# Patient Record
Sex: MALE | Race: WHITE | NOT HISPANIC OR LATINO
[De-identification: names, ages, dates, MRNs, and addresses within clinical notes are randomized per-mention and may not be internally consistent; named-entity substitution may affect disease eponyms.]

---

## 2017-05-16 ENCOUNTER — RESULT REVIEW (OUTPATIENT)
Age: 72
End: 2017-05-16

## 2018-07-19 ENCOUNTER — EMERGENCY (EMERGENCY)
Facility: HOSPITAL | Age: 73
LOS: 1 days | Discharge: HOME | End: 2018-07-19
Attending: EMERGENCY MEDICINE | Admitting: EMERGENCY MEDICINE
Payer: MEDICARE

## 2018-07-19 VITALS
DIASTOLIC BLOOD PRESSURE: 789 MMHG | OXYGEN SATURATION: 96 % | TEMPERATURE: 98 F | SYSTOLIC BLOOD PRESSURE: 169 MMHG | HEIGHT: 68 IN | HEART RATE: 74 BPM | RESPIRATION RATE: 16 BRPM | WEIGHT: 265 LBS

## 2018-07-19 VITALS
TEMPERATURE: 98 F | HEART RATE: 67 BPM | RESPIRATION RATE: 16 BRPM | DIASTOLIC BLOOD PRESSURE: 88 MMHG | OXYGEN SATURATION: 94 % | SYSTOLIC BLOOD PRESSURE: 160 MMHG

## 2018-07-19 LAB
ALBUMIN SERPL ELPH-MCNC: 4.1 G/DL — SIGNIFICANT CHANGE UP (ref 3.3–5)
ALP SERPL-CCNC: 53 U/L — SIGNIFICANT CHANGE UP (ref 30–120)
ALT FLD-CCNC: 51 U/L DA — SIGNIFICANT CHANGE UP (ref 10–60)
ANION GAP SERPL CALC-SCNC: 8 MMOL/L — SIGNIFICANT CHANGE UP (ref 5–17)
APTT BLD: 32 SEC — SIGNIFICANT CHANGE UP (ref 27.5–37.4)
AST SERPL-CCNC: 48 U/L — HIGH (ref 10–40)
BASOPHILS # BLD AUTO: 0.07 K/UL — SIGNIFICANT CHANGE UP (ref 0–0.2)
BASOPHILS NFR BLD AUTO: 0.7 % — SIGNIFICANT CHANGE UP (ref 0–2)
BILIRUB SERPL-MCNC: 0.3 MG/DL — SIGNIFICANT CHANGE UP (ref 0.2–1.2)
BUN SERPL-MCNC: 22 MG/DL — SIGNIFICANT CHANGE UP (ref 7–23)
CALCIUM SERPL-MCNC: 8.9 MG/DL — SIGNIFICANT CHANGE UP (ref 8.4–10.5)
CHLORIDE SERPL-SCNC: 99 MMOL/L — SIGNIFICANT CHANGE UP (ref 96–108)
CK MB BLD-MCNC: 0.9 % — SIGNIFICANT CHANGE UP (ref 0–3.5)
CK MB CFR SERPL CALC: 1.9 NG/ML — SIGNIFICANT CHANGE UP (ref 0–3.6)
CK SERPL-CCNC: 222 U/L — SIGNIFICANT CHANGE UP (ref 39–308)
CO2 SERPL-SCNC: 28 MMOL/L — SIGNIFICANT CHANGE UP (ref 22–31)
CREAT SERPL-MCNC: 1.06 MG/DL — SIGNIFICANT CHANGE UP (ref 0.5–1.3)
EOSINOPHIL # BLD AUTO: 0.31 K/UL — SIGNIFICANT CHANGE UP (ref 0–0.5)
EOSINOPHIL NFR BLD AUTO: 3.1 % — SIGNIFICANT CHANGE UP (ref 0–6)
GLUCOSE SERPL-MCNC: 106 MG/DL — HIGH (ref 70–99)
HCT VFR BLD CALC: 40.6 % — SIGNIFICANT CHANGE UP (ref 39–50)
HGB BLD-MCNC: 14 G/DL — SIGNIFICANT CHANGE UP (ref 13–17)
IMM GRANULOCYTES NFR BLD AUTO: 0.6 % — SIGNIFICANT CHANGE UP (ref 0–1.5)
INR BLD: 1.16 RATIO — SIGNIFICANT CHANGE UP (ref 0.88–1.16)
LYMPHOCYTES # BLD AUTO: 1.56 K/UL — SIGNIFICANT CHANGE UP (ref 1–3.3)
LYMPHOCYTES # BLD AUTO: 15.4 % — SIGNIFICANT CHANGE UP (ref 13–44)
MCHC RBC-ENTMCNC: 30.9 PG — SIGNIFICANT CHANGE UP (ref 27–34)
MCHC RBC-ENTMCNC: 34.5 GM/DL — SIGNIFICANT CHANGE UP (ref 32–36)
MCV RBC AUTO: 89.6 FL — SIGNIFICANT CHANGE UP (ref 80–100)
MONOCYTES # BLD AUTO: 0.72 K/UL — SIGNIFICANT CHANGE UP (ref 0–0.9)
MONOCYTES NFR BLD AUTO: 7.1 % — SIGNIFICANT CHANGE UP (ref 2–14)
NEUTROPHILS # BLD AUTO: 7.38 K/UL — SIGNIFICANT CHANGE UP (ref 1.8–7.4)
NEUTROPHILS NFR BLD AUTO: 73.1 % — SIGNIFICANT CHANGE UP (ref 43–77)
PLATELET # BLD AUTO: 200 K/UL — SIGNIFICANT CHANGE UP (ref 150–400)
POTASSIUM SERPL-MCNC: 3.8 MMOL/L — SIGNIFICANT CHANGE UP (ref 3.5–5.3)
POTASSIUM SERPL-SCNC: 3.8 MMOL/L — SIGNIFICANT CHANGE UP (ref 3.5–5.3)
PROT SERPL-MCNC: 7.7 G/DL — SIGNIFICANT CHANGE UP (ref 6–8.3)
PROTHROM AB SERPL-ACNC: 12.7 SEC — SIGNIFICANT CHANGE UP (ref 9.8–12.7)
RBC # BLD: 4.53 M/UL — SIGNIFICANT CHANGE UP (ref 4.2–5.8)
RBC # FLD: 13.1 % — SIGNIFICANT CHANGE UP (ref 10.3–14.5)
SODIUM SERPL-SCNC: 135 MMOL/L — SIGNIFICANT CHANGE UP (ref 135–145)
TROPONIN I SERPL-MCNC: 0 NG/ML — LOW (ref 0.02–0.06)
WBC # BLD: 10.1 K/UL — SIGNIFICANT CHANGE UP (ref 3.8–10.5)
WBC # FLD AUTO: 10.1 K/UL — SIGNIFICANT CHANGE UP (ref 3.8–10.5)

## 2018-07-19 PROCEDURE — 82553 CREATINE MB FRACTION: CPT

## 2018-07-19 PROCEDURE — 93005 ELECTROCARDIOGRAM TRACING: CPT

## 2018-07-19 PROCEDURE — 84484 ASSAY OF TROPONIN QUANT: CPT

## 2018-07-19 PROCEDURE — 71250 CT THORAX DX C-: CPT

## 2018-07-19 PROCEDURE — 85027 COMPLETE CBC AUTOMATED: CPT

## 2018-07-19 PROCEDURE — 73110 X-RAY EXAM OF WRIST: CPT

## 2018-07-19 PROCEDURE — 80053 COMPREHEN METABOLIC PANEL: CPT

## 2018-07-19 PROCEDURE — 90471 IMMUNIZATION ADMIN: CPT

## 2018-07-19 PROCEDURE — 36415 COLL VENOUS BLD VENIPUNCTURE: CPT

## 2018-07-19 PROCEDURE — 73110 X-RAY EXAM OF WRIST: CPT | Mod: 26,RT

## 2018-07-19 PROCEDURE — 93010 ELECTROCARDIOGRAM REPORT: CPT

## 2018-07-19 PROCEDURE — 73140 X-RAY EXAM OF FINGER(S): CPT

## 2018-07-19 PROCEDURE — 90715 TDAP VACCINE 7 YRS/> IM: CPT

## 2018-07-19 PROCEDURE — 99285 EMERGENCY DEPT VISIT HI MDM: CPT

## 2018-07-19 PROCEDURE — 85610 PROTHROMBIN TIME: CPT

## 2018-07-19 PROCEDURE — 71101 X-RAY EXAM UNILAT RIBS/CHEST: CPT | Mod: 26

## 2018-07-19 PROCEDURE — 82550 ASSAY OF CK (CPK): CPT

## 2018-07-19 PROCEDURE — 99284 EMERGENCY DEPT VISIT MOD MDM: CPT | Mod: 25

## 2018-07-19 PROCEDURE — 71101 X-RAY EXAM UNILAT RIBS/CHEST: CPT

## 2018-07-19 PROCEDURE — 73140 X-RAY EXAM OF FINGER(S): CPT | Mod: 26,RT

## 2018-07-19 PROCEDURE — 85730 THROMBOPLASTIN TIME PARTIAL: CPT

## 2018-07-19 PROCEDURE — 71250 CT THORAX DX C-: CPT | Mod: 26

## 2018-07-19 RX ORDER — OXYCODONE AND ACETAMINOPHEN 5; 325 MG/1; MG/1
1 TABLET ORAL ONCE
Qty: 0 | Refills: 0 | Status: DISCONTINUED | OUTPATIENT
Start: 2018-07-19 | End: 2018-07-19

## 2018-07-19 RX ORDER — TETANUS TOXOID, REDUCED DIPHTHERIA TOXOID AND ACELLULAR PERTUSSIS VACCINE, ADSORBED 5; 2.5; 8; 8; 2.5 [IU]/.5ML; [IU]/.5ML; UG/.5ML; UG/.5ML; UG/.5ML
0.5 SUSPENSION INTRAMUSCULAR ONCE
Qty: 0 | Refills: 0 | Status: COMPLETED | OUTPATIENT
Start: 2018-07-19 | End: 2018-07-19

## 2018-07-19 RX ADMIN — OXYCODONE AND ACETAMINOPHEN 1 TABLET(S): 5; 325 TABLET ORAL at 18:15

## 2018-07-19 RX ADMIN — OXYCODONE AND ACETAMINOPHEN 1 TABLET(S): 5; 325 TABLET ORAL at 16:47

## 2018-07-19 RX ADMIN — TETANUS TOXOID, REDUCED DIPHTHERIA TOXOID AND ACELLULAR PERTUSSIS VACCINE, ADSORBED 0.5 MILLILITER(S): 5; 2.5; 8; 8; 2.5 SUSPENSION INTRAMUSCULAR at 16:48

## 2018-07-19 RX ADMIN — OXYCODONE AND ACETAMINOPHEN 1 TABLET(S): 5; 325 TABLET ORAL at 17:10

## 2018-07-19 RX ADMIN — OXYCODONE AND ACETAMINOPHEN 1 TABLET(S): 5; 325 TABLET ORAL at 17:56

## 2018-07-19 NOTE — ED PROVIDER NOTE - CARE PLAN
Principal Discharge DX:	Rib pain on right side  Secondary Diagnosis:	Wrist pain, right  Secondary Diagnosis:	Finger pain, right Principal Discharge DX:	Rib fractures  Secondary Diagnosis:	Wrist pain, right  Secondary Diagnosis:	Finger pain, right Principal Discharge DX:	Rib fractures  Assessment and plan of treatment:	3 ribs fracture 3-5 rt side  Secondary Diagnosis:	Wrist pain, right  Secondary Diagnosis:	Finger pain, right

## 2018-07-19 NOTE — ED PROVIDER NOTE - MEDICAL DECISION MAKING DETAILS
mechanical fall, right rib pain , right wrist, right 5th finger, xrays, pain med, incentive spirometer, r/o fx

## 2018-07-19 NOTE — ED PROVIDER NOTE - PSH
History of Colon Resection  sigmoid colon resection 2009 Diverticulitis  History of Colon Resection    History of Total Knee Replacement  3/1/04  History of Total Knee Replacement  Right knee 5/24/11  History of Total Shoulder Replacement  right shoulder 9/9/05  Knee Arthroscopy  bilat  knees  x3 between [id number]  S/P Coronary Artery Stent Placement  Drug eluting stent stent placement   1/2/2007 Diagonal #1  S/P Coronary Artery Stent Placement  9/6/2000 in  LAD  Shoulder Arthroscopy  1983 right shoulder

## 2018-07-19 NOTE — ED PROVIDER NOTE - ATTENDING CONTRIBUTION TO CARE
I, Dr Cisneros, have personally performed a face to face diagnostic evaluation on this patient with the PA/NP. I have reviewed the PA/NP's note and agree with the history, Physical exam and plan of care As per history 72 y male presents with s/p fall off step at doctors office,  states he injured his right ribs, right wrist, right 5th finger, has dry abrasions to right 5th finger and right knee, needs tetanus.  denies head injury, no loc, no neck pain, Pertinent PE not in any acute distress complaining of moderate to severe rt rib pain with certain movement and taking a deep breath the radiologic finding was discuss with the patient offered to stay in the hospital for observation but patient opted to go home given extensive instruction for possible complication and advice to return to ER if symptom worsen.

## 2018-07-19 NOTE — ED PROVIDER NOTE - PROGRESS NOTE DETAILS
spoke with thoracic Dr Michael Dubose, discussed case, minimally displaced rib fractures 3-5, advised can prescribe pain medication and follow up in office. patient states pain medication given helped reduce pain, patient was offered admission, declined admission, advised if any concerns, worsening condition, return to ed, rx for percocet sent to pharmacy, patient states he has lidocaine patches at home,  incentive spirometer given with instructions, copy of results given, discussed rib fractures minimally displaced 3-5.  advised patient to follow up with thoracic specialist Dr Michael Dubose, call tomorrow to arrange follow up , also follow up with his orthopedic, patient refused abrasion care, refused ace wrap to right wrist

## 2018-07-19 NOTE — ED PROVIDER NOTE - OBJECTIVE STATEMENT
72 y male presents with s/p fall off step at doctors office,  states he injured his right ribs, right wrist, right 5th finger, has dry abrasions to right 5th finger and right knee, needs tetanus.  denies head injury, no loc, no neck pain, no chest pain, no abodminal pain, no back pain, patient ambulated in to ED.  PMD Dr MIRTA Larkin, Ortho Dr Kern  states he took motrin before coming to ed, did not help much with the pain.  wife at bedside  PMH: Arthritis  BPH (Benign Prostatic Hypertrophy)  CAD (Coronary Artery Disease)    Coronary Artery  Stents  Pt had stent placement in 2000; and then 1/2/2007 (drug eluting stent), Depression/ anxiety    Diverticulitis  Dyslipidemia  GERD (Gastroesophageal Reflux Disease)  HTN (Hypertension)  MI (Myocardial Infarction)  2000  Obesity    GOLDY (Obstructive Sleep Apnea)  sleep study positive (2005), uses CPAP machine

## 2018-07-19 NOTE — ED ADULT NURSE NOTE - PMH
Arthritis    BPH (Benign Prostatic Hypertrophy)    CAD (Coronary Artery Disease)    Coronary Artery  Stents  Pt had stent placement in 2000; and then 1/2/2007 (drug eluting stent)  Depression/ anxiety    Diverticulitis    Dyslipidemia    GERD (Gastroesophageal Reflux Disease)    HTN (Hypertension)    MI (Myocardial Infarction)  2000  Obesity    GOLDY (Obstructive Sleep Apnea)  sleep study positive (2005), uses CPAP machine

## 2018-07-19 NOTE — ED PROVIDER NOTE - EXTREMITY EXAM
right lateral rib pain with palpation, no visible abnormalities , right wrist and right 5th finger pain with from, no deformity, nvi

## 2018-07-19 NOTE — ED ADULT NURSE NOTE - PSH
History of Colon Resection  sigmoid colon resection 2009 Diverticulitis  History of Colon Resection    History of Total Knee Replacement  3/1/04  History of Total Knee Replacement  Right knee 5/24/11  History of Total Shoulder Replacement  right shoulder 9/9/05  Knee Arthroscopy  bilat  knees  x3 between [id number]  S/P Coronary Artery Stent Placement  Drug eluting stent stent placement   1/2/2007 Diagonal #1  S/P Coronary Artery Stent Placement  9/6/2000 in  LAD  Shoulder Arthroscopy  1983 right shoulder

## 2018-07-19 NOTE — ED ADULT NURSE NOTE - OBJECTIVE STATEMENT
Amb to ED. Pt fell down 1 step outside a doctor office injuring his rt knee & rt 5th finger- abrasions noted on each. Able to freely move each. Also c/o tenderness over rt wrist & rt ribs. Discomfort with deep inspiration & movement

## 2018-07-19 NOTE — ED ADULT NURSE REASSESSMENT NOTE - NS ED NURSE REASSESS COMMENT FT1
pt was offered but declined ace wrap to right wrist
received report and assumed care of pt at change of shift. pt is awake and alert, ambulatory on unit. PA and MD aware of results. pt instructed on use of incentive spirometry. visitor at bedside. vs as charted

## 2019-04-22 ENCOUNTER — INPATIENT (INPATIENT)
Facility: HOSPITAL | Age: 74
LOS: 1 days | Discharge: HOME HEALTH | End: 2019-04-24
Attending: INTERNAL MEDICINE | Admitting: FAMILY MEDICINE
Payer: MEDICARE

## 2019-04-22 VITALS
WEIGHT: 190.04 LBS | OXYGEN SATURATION: 100 % | HEIGHT: 69 IN | HEART RATE: 64 BPM | RESPIRATION RATE: 18 BRPM | TEMPERATURE: 98 F

## 2019-04-22 LAB
ALBUMIN SERPL ELPH-MCNC: 3.2 G/DL — LOW (ref 3.3–5)
ALP SERPL-CCNC: 70 U/L — SIGNIFICANT CHANGE UP (ref 40–120)
ALT FLD-CCNC: 47 U/L — SIGNIFICANT CHANGE UP (ref 12–78)
ANION GAP SERPL CALC-SCNC: 8 MMOL/L — SIGNIFICANT CHANGE UP (ref 5–17)
APTT BLD: 30.5 SEC — SIGNIFICANT CHANGE UP (ref 27.5–36.3)
AST SERPL-CCNC: 32 U/L — SIGNIFICANT CHANGE UP (ref 15–37)
BASOPHILS # BLD AUTO: 0.16 K/UL — SIGNIFICANT CHANGE UP (ref 0–0.2)
BASOPHILS NFR BLD AUTO: 1.2 % — SIGNIFICANT CHANGE UP (ref 0–2)
BILIRUB SERPL-MCNC: 0.5 MG/DL — SIGNIFICANT CHANGE UP (ref 0.2–1.2)
BUN SERPL-MCNC: 13 MG/DL — SIGNIFICANT CHANGE UP (ref 7–23)
CALCIUM SERPL-MCNC: 8.5 MG/DL — SIGNIFICANT CHANGE UP (ref 8.5–10.1)
CHLORIDE SERPL-SCNC: 104 MMOL/L — SIGNIFICANT CHANGE UP (ref 96–108)
CK SERPL-CCNC: 90 U/L — SIGNIFICANT CHANGE UP (ref 26–308)
CO2 SERPL-SCNC: 29 MMOL/L — SIGNIFICANT CHANGE UP (ref 22–31)
CREAT SERPL-MCNC: 0.81 MG/DL — SIGNIFICANT CHANGE UP (ref 0.5–1.3)
EOSINOPHIL # BLD AUTO: 0.99 K/UL — HIGH (ref 0–0.5)
EOSINOPHIL NFR BLD AUTO: 7.7 % — HIGH (ref 0–6)
GLUCOSE SERPL-MCNC: 108 MG/DL — HIGH (ref 70–99)
HCT VFR BLD CALC: 33.4 % — LOW (ref 39–50)
HGB BLD-MCNC: 11.1 G/DL — LOW (ref 13–17)
IMM GRANULOCYTES NFR BLD AUTO: 1.9 % — HIGH (ref 0–1.5)
INR BLD: 1.18 RATIO — HIGH (ref 0.88–1.16)
LYMPHOCYTES # BLD AUTO: 1.4 K/UL — SIGNIFICANT CHANGE UP (ref 1–3.3)
LYMPHOCYTES # BLD AUTO: 10.8 % — LOW (ref 13–44)
MCHC RBC-ENTMCNC: 31.1 PG — SIGNIFICANT CHANGE UP (ref 27–34)
MCHC RBC-ENTMCNC: 33.2 GM/DL — SIGNIFICANT CHANGE UP (ref 32–36)
MCV RBC AUTO: 93.6 FL — SIGNIFICANT CHANGE UP (ref 80–100)
MONOCYTES # BLD AUTO: 0.76 K/UL — SIGNIFICANT CHANGE UP (ref 0–0.9)
MONOCYTES NFR BLD AUTO: 5.9 % — SIGNIFICANT CHANGE UP (ref 2–14)
NEUTROPHILS # BLD AUTO: 9.35 K/UL — HIGH (ref 1.8–7.4)
NEUTROPHILS NFR BLD AUTO: 72.5 % — SIGNIFICANT CHANGE UP (ref 43–77)
NRBC # BLD: 0 /100 WBCS — SIGNIFICANT CHANGE UP (ref 0–0)
NT-PROBNP SERPL-SCNC: 1598 PG/ML — HIGH (ref 0–125)
PLATELET # BLD AUTO: 432 K/UL — HIGH (ref 150–400)
POTASSIUM SERPL-MCNC: 3.9 MMOL/L — SIGNIFICANT CHANGE UP (ref 3.5–5.3)
POTASSIUM SERPL-SCNC: 3.9 MMOL/L — SIGNIFICANT CHANGE UP (ref 3.5–5.3)
PROT SERPL-MCNC: 7.6 GM/DL — SIGNIFICANT CHANGE UP (ref 6–8.3)
PROTHROM AB SERPL-ACNC: 13.2 SEC — HIGH (ref 10–12.9)
RBC # BLD: 3.57 M/UL — LOW (ref 4.2–5.8)
RBC # FLD: 12.9 % — SIGNIFICANT CHANGE UP (ref 10.3–14.5)
SODIUM SERPL-SCNC: 141 MMOL/L — SIGNIFICANT CHANGE UP (ref 135–145)
TROPONIN I SERPL-MCNC: 0.06 NG/ML — HIGH (ref 0.01–0.04)
TROPONIN I SERPL-MCNC: 0.06 NG/ML — HIGH (ref 0.01–0.04)
TROPONIN I SERPL-MCNC: 0.09 NG/ML — HIGH (ref 0.01–0.04)
WBC # BLD: 12.91 K/UL — HIGH (ref 3.8–10.5)
WBC # FLD AUTO: 12.91 K/UL — HIGH (ref 3.8–10.5)

## 2019-04-22 PROCEDURE — 93971 EXTREMITY STUDY: CPT | Mod: 26,RT

## 2019-04-22 PROCEDURE — 99285 EMERGENCY DEPT VISIT HI MDM: CPT

## 2019-04-22 PROCEDURE — 93010 ELECTROCARDIOGRAM REPORT: CPT

## 2019-04-22 PROCEDURE — 71275 CT ANGIOGRAPHY CHEST: CPT | Mod: 26

## 2019-04-22 RX ORDER — ALUMINUM ZIRCONIUM TRICHLOROHYDREX GLY 0.2 G/G
1 STICK TOPICAL
Qty: 0 | Refills: 0 | COMMUNITY

## 2019-04-22 RX ORDER — HEPARIN SODIUM 5000 [USP'U]/ML
5000 INJECTION INTRAVENOUS; SUBCUTANEOUS EVERY 12 HOURS
Qty: 0 | Refills: 0 | Status: DISCONTINUED | OUTPATIENT
Start: 2019-04-22 | End: 2019-04-24

## 2019-04-22 RX ORDER — PANTOPRAZOLE SODIUM 20 MG/1
40 TABLET, DELAYED RELEASE ORAL
Qty: 0 | Refills: 0 | Status: DISCONTINUED | OUTPATIENT
Start: 2019-04-22 | End: 2019-04-24

## 2019-04-22 RX ORDER — LATANOPROST 0.05 MG/ML
1 SOLUTION/ DROPS OPHTHALMIC; TOPICAL
Qty: 0 | Refills: 0 | COMMUNITY

## 2019-04-22 RX ORDER — SERTRALINE 25 MG/1
1 TABLET, FILM COATED ORAL
Qty: 0 | Refills: 0 | COMMUNITY

## 2019-04-22 RX ORDER — COLCHICINE 0.6 MG
1 TABLET ORAL
Qty: 0 | Refills: 0 | COMMUNITY

## 2019-04-22 RX ORDER — LEVOTHYROXINE SODIUM 125 MCG
0 TABLET ORAL
Qty: 90 | Refills: 0 | COMMUNITY

## 2019-04-22 RX ORDER — HYDRALAZINE HCL 50 MG
0 TABLET ORAL
Qty: 270 | Refills: 0 | COMMUNITY

## 2019-04-22 RX ORDER — LATANOPROST 0.05 MG/ML
1 SOLUTION/ DROPS OPHTHALMIC; TOPICAL AT BEDTIME
Qty: 0 | Refills: 0 | Status: DISCONTINUED | OUTPATIENT
Start: 2019-04-22 | End: 2019-04-24

## 2019-04-22 RX ORDER — DAPTOMYCIN 500 MG/10ML
600 INJECTION, POWDER, LYOPHILIZED, FOR SOLUTION INTRAVENOUS
Qty: 0 | Refills: 0 | COMMUNITY

## 2019-04-22 RX ORDER — CLOPIDOGREL BISULFATE 75 MG/1
75 TABLET, FILM COATED ORAL DAILY
Qty: 0 | Refills: 0 | Status: DISCONTINUED | OUTPATIENT
Start: 2019-04-22 | End: 2019-04-22

## 2019-04-22 RX ORDER — SERTRALINE 25 MG/1
100 TABLET, FILM COATED ORAL DAILY
Qty: 0 | Refills: 0 | Status: DISCONTINUED | OUTPATIENT
Start: 2019-04-22 | End: 2019-04-24

## 2019-04-22 RX ORDER — ASPIRIN/CALCIUM CARB/MAGNESIUM 324 MG
325 TABLET ORAL
Qty: 0 | Refills: 0 | Status: DISCONTINUED | OUTPATIENT
Start: 2019-04-22 | End: 2019-04-24

## 2019-04-22 RX ORDER — ASPIRIN/CALCIUM CARB/MAGNESIUM 324 MG
1 TABLET ORAL
Qty: 0 | Refills: 0 | COMMUNITY

## 2019-04-22 RX ORDER — VERAPAMIL HCL 240 MG
1 CAPSULE, EXTENDED RELEASE PELLETS 24 HR ORAL
Qty: 0 | Refills: 0 | COMMUNITY

## 2019-04-22 RX ORDER — LOPERAMIDE HCL 2 MG
1 TABLET ORAL
Qty: 0 | Refills: 0 | COMMUNITY

## 2019-04-22 RX ORDER — LOSARTAN POTASSIUM 100 MG/1
100 TABLET, FILM COATED ORAL DAILY
Qty: 0 | Refills: 0 | Status: DISCONTINUED | OUTPATIENT
Start: 2019-04-22 | End: 2019-04-24

## 2019-04-22 RX ORDER — LEVOTHYROXINE SODIUM 125 MCG
1 TABLET ORAL
Qty: 0 | Refills: 0 | COMMUNITY

## 2019-04-22 RX ORDER — ACETAMINOPHEN 500 MG
650 TABLET ORAL EVERY 6 HOURS
Qty: 0 | Refills: 0 | Status: DISCONTINUED | OUTPATIENT
Start: 2019-04-22 | End: 2019-04-24

## 2019-04-22 RX ORDER — HYDRALAZINE HCL 50 MG
50 TABLET ORAL DAILY
Qty: 0 | Refills: 0 | Status: DISCONTINUED | OUTPATIENT
Start: 2019-04-22 | End: 2019-04-23

## 2019-04-22 RX ORDER — LOPERAMIDE HCL 2 MG
2 TABLET ORAL
Qty: 0 | Refills: 0 | Status: DISCONTINUED | OUTPATIENT
Start: 2019-04-22 | End: 2019-04-24

## 2019-04-22 RX ORDER — SERTRALINE 25 MG/1
0 TABLET, FILM COATED ORAL
Qty: 90 | Refills: 0 | COMMUNITY

## 2019-04-22 RX ORDER — METHYLCELLULOSE 500 MG
2 TABLET ORAL
Qty: 0 | Refills: 0 | COMMUNITY

## 2019-04-22 RX ORDER — LATANOPROST 0.05 MG/ML
0 SOLUTION/ DROPS OPHTHALMIC; TOPICAL
Qty: 25 | Refills: 0 | COMMUNITY

## 2019-04-22 RX ORDER — ESOMEPRAZOLE MAGNESIUM 40 MG/1
1 CAPSULE, DELAYED RELEASE ORAL
Qty: 0 | Refills: 0 | COMMUNITY

## 2019-04-22 RX ORDER — LOSARTAN POTASSIUM 100 MG/1
1 TABLET, FILM COATED ORAL
Qty: 0 | Refills: 0 | COMMUNITY

## 2019-04-22 RX ORDER — VERAPAMIL HCL 240 MG
180 CAPSULE, EXTENDED RELEASE PELLETS 24 HR ORAL AT BEDTIME
Qty: 0 | Refills: 0 | Status: DISCONTINUED | OUTPATIENT
Start: 2019-04-22 | End: 2019-04-24

## 2019-04-22 RX ORDER — HYDRALAZINE HCL 50 MG
1 TABLET ORAL
Qty: 0 | Refills: 0 | COMMUNITY

## 2019-04-22 RX ORDER — ATORVASTATIN CALCIUM 80 MG/1
1 TABLET, FILM COATED ORAL
Qty: 0 | Refills: 0 | COMMUNITY

## 2019-04-22 RX ORDER — LEVOTHYROXINE SODIUM 125 MCG
50 TABLET ORAL DAILY
Qty: 0 | Refills: 0 | Status: DISCONTINUED | OUTPATIENT
Start: 2019-04-22 | End: 2019-04-24

## 2019-04-22 RX ORDER — IBUPROFEN 200 MG
600 TABLET ORAL ONCE
Qty: 0 | Refills: 0 | Status: COMPLETED | OUTPATIENT
Start: 2019-04-22 | End: 2019-04-22

## 2019-04-22 RX ORDER — VERAPAMIL HCL 240 MG
0 CAPSULE, EXTENDED RELEASE PELLETS 24 HR ORAL
Qty: 90 | Refills: 0 | COMMUNITY

## 2019-04-22 RX ORDER — DAPTOMYCIN 500 MG/10ML
500 INJECTION, POWDER, LYOPHILIZED, FOR SOLUTION INTRAVENOUS EVERY 24 HOURS
Qty: 0 | Refills: 0 | Status: DISCONTINUED | OUTPATIENT
Start: 2019-04-22 | End: 2019-04-24

## 2019-04-22 RX ADMIN — HEPARIN SODIUM 5000 UNIT(S): 5000 INJECTION INTRAVENOUS; SUBCUTANEOUS at 17:38

## 2019-04-22 RX ADMIN — Medication 600 MILLIGRAM(S): at 22:55

## 2019-04-22 RX ADMIN — Medication 180 MILLIGRAM(S): at 21:58

## 2019-04-22 RX ADMIN — DAPTOMYCIN 120 MILLIGRAM(S): 500 INJECTION, POWDER, LYOPHILIZED, FOR SOLUTION INTRAVENOUS at 17:36

## 2019-04-22 RX ADMIN — SERTRALINE 100 MILLIGRAM(S): 25 TABLET, FILM COATED ORAL at 15:51

## 2019-04-22 RX ADMIN — LOSARTAN POTASSIUM 100 MILLIGRAM(S): 100 TABLET, FILM COATED ORAL at 17:39

## 2019-04-22 RX ADMIN — PANTOPRAZOLE SODIUM 40 MILLIGRAM(S): 20 TABLET, DELAYED RELEASE ORAL at 15:51

## 2019-04-22 RX ADMIN — Medication 50 MICROGRAM(S): at 15:51

## 2019-04-22 RX ADMIN — Medication 50 MILLIGRAM(S): at 15:51

## 2019-04-22 RX ADMIN — Medication 325 MILLIGRAM(S): at 17:38

## 2019-04-22 RX ADMIN — Medication 600 MILLIGRAM(S): at 21:58

## 2019-04-22 RX ADMIN — Medication 2 MILLIGRAM(S): at 17:45

## 2019-04-22 NOTE — ED ADULT NURSE NOTE - NSIMPLEMENTINTERV_GEN_ALL_ED
Implemented All Fall Risk Interventions:  [place] to call system. Call bell, personal items and telephone within reach. Instruct patient to call for assistance. Room bathroom lighting operational. Non-slip footwear when patient is off stretcher. Physically safe environment: no spills, clutter or unnecessary equipment. Stretcher in lowest position, wheels locked, appropriate side rails in place. Provide visual cue, wrist band, yellow gown, etc. Monitor gait and stability. Monitor for mental status changes and reorient to person, place, and time. Review medications for side effects contributing to fall risk. Reinforce activity limits and safety measures with patient and family.

## 2019-04-22 NOTE — PATIENT PROFILE ADULT - HARM RISK FACTORS
Please bring in a copy of your advance directive at your next visit, or drop off a copy at any [facility] facility so that it can be added to your medical record.      yes

## 2019-04-22 NOTE — ED ADULT NURSE REASSESSMENT NOTE - NS ED NURSE REASSESS COMMENT FT1
Received pt from previous nurse. Pt is A&OX4. VSS on 2L. Pt ambulated to the bathroom without any complications. PICC in right arm. Pt denies any pain or discomfort at this time. Safety and comfort measures in place. Hourly rounding will be done on my time. Will continue to monitor.

## 2019-04-22 NOTE — ED PROVIDER NOTE - PMH
Arthritis    BPH (Benign Prostatic Hypertrophy)    CAD (Coronary Artery Disease)    Coronary Artery  Stents  Pt had stent placement in 2000; and then 1/2/2007 (drug eluting stent)  Depression/ anxiety    Diverticulitis    Dyslipidemia    GERD (Gastroesophageal Reflux Disease)    Gout    HTN (Hypertension)    MI (Myocardial Infarction)  2000  Obesity    GOLDY (Obstructive Sleep Apnea)  sleep study positive (2005), uses CPAP machine

## 2019-04-22 NOTE — PHARMACOTHERAPY INTERVENTION NOTE - COMMENTS
Completed medication history. Home medication reviewed with patient (who is a poor historian) and confirmed with Dr First Completed medication history. Home medication reviewed with patient (who is a poor historian) and confirmed with Dr Manning    Also spoke to Dr. Cayetano Henry who confirmed that patient takes daptomycin 600mg QD at home (as per the IV bag) but was only able to order 500mg QD here. As per his weight, the dosing should be 6 mg/kg QD for septic arthritis, which would be 500mg. He will consult with ID for the daptomycin dose

## 2019-04-22 NOTE — H&P ADULT - HISTORY OF PRESENT ILLNESS
73 year old M with PMH of CAD s/p stents (LAD 2001, 2007 drug eluting stent), GOLDY on CPAP, presents with complaint of dyspnea on exertion for 2 days. Patient was recently hospitalized at [place] for Septic arthritis of right knee. Patient was admitted for 1 week and discharged with PICC line in right arm and daily cubicin. Patient states he started having dyspnea on exertion yesterday with walking. Denies chest pain, nausea, vomiting, fever, chills, abdominal pain, knee pain, lower extremity edema. Patient states he had normal stress test and echo in 2017. Patient's cardiologist is Dr. Ross in [place]. 73 year old M with PMH of CAD s/p stents (PCI stent LAD (2000), ASMI 2000), Paroxysmal Afib, GOLDY on CPAP, presents with complaint of dyspnea on exertion for 2 days. Patient was recently hospitalized at [place] for Septic arthritis of right knee. Patient was admitted for 1 week and discharged with PICC line in right arm and daily cubicin. Patient states he started having dyspnea on exertion yesterday with walking. Denies chest pain, nausea, vomiting, fever, chills, abdominal pain, knee pain, lower extremity edema. Patient states he had normal stress test and echo recently. Patient's cardiologist is Dr. Ross in [place].    Records obtained from Dr. Ross: Echo 2018: LVEF 60-65%, mild LVH, mild aortic root dilatation, normal LV and sy stolic fxn, no Pulm HTN, trace MR, diastolic dysfxn  - Nuclear stress test 2017: Small, mild, fixed anteroapical defect. No definite ischemia.

## 2019-04-22 NOTE — H&P ADULT - NSICDXPASTSURGICALHX_GEN_ALL_CORE_FT
PAST SURGICAL HISTORY:  History of Colon Resection     History of Colon Resection sigmoid colon resection 2009 Diverticulitis    History of Total Knee Replacement Right knee 5/24/11    History of Total Knee Replacement 3/1/04    History of Total Shoulder Replacement right shoulder 9/9/05    Knee Arthroscopy bilat  knees  x3 between [id number]    S/P Coronary Artery Stent Placement 9/6/2000 in  LAD    S/P Coronary Artery Stent Placement Drug eluting stent stent placement   1/2/2007 Diagonal #1    Shoulder Arthroscopy 1983 right shoulder

## 2019-04-22 NOTE — H&P ADULT - ASSESSMENT
73 year old M with PMH of CAD s/p stents (LAD 2001, 2007 drug eluting stent), GOLDY on CPAP, presents with complaint of dyspnea on exertion for 2 days.    #) Dyspnea on exertion, Elevated Troponin: Mildly elevated troponin now downtrending. No CP, no current dyspnea. Pt recent treatment for Septic arthritis on abx treatment. Kendra score 97. AMY risk score 20 (low). No clinical evidence of HF. PE/DVT ruled out with CTA and doppler.  - Admit to Telemetry  - Cardiology Consulted: Discussed with Dr. Mills, Continue ASA, Start Plavix  - Trend Troponin (0.087 -> 0.062)  - Check CPK, BNP  - NC O2 2L > 90% goal    #) Septic Arthritis: Leukocytosis  - Continue Abx through PICC  - CBC in AM    #) GOLDY on CPAP  - CPAP qhs    #) HTN  - Continue medications    #) DVT ppx  - Heparin 73 year old M with PMH of CAD s/p stents (LAD 2001, 2007 drug eluting stent), GOLDY on CPAP, presents with complaint of dyspnea on exertion for 2 days.    #) Dyspnea on exertion, Elevated Troponin: Mildly elevated troponin now downtrending. No CP, no current dyspnea. Pt recent treatment for Septic arthritis on abx treatment. Kendra score 97. AMY risk score 20 (low). No clinical evidence of HF. PE/DVT ruled out with CTA and doppler.  - Admit to Telemetry  - Cardiology Consulted: Discussed with Dr. Mills, Continue ASA, Start Plavix; Pt declined Plavix.   - Trend Troponin (0.087 -> 0.062)  - Check CPK, BNP  - NC O2 2L > 90% goal    #) Septic Arthritis: Leukocytosis  - Continue Abx through PICC  - CBC in AM    #) GOLDY on CPAP  - CPAP qhs    #) HTN  - Continue medications    #) DVT ppx  - Heparin

## 2019-04-22 NOTE — H&P ADULT - NSHPPHYSICALEXAM_GEN_ALL_CORE
Vital Signs Last 24 Hrs  T(C): 36.9 (22 Apr 2019 11:40), Max: 36.9 (22 Apr 2019 11:40)  T(F): 98.5 (22 Apr 2019 11:40), Max: 98.5 (22 Apr 2019 11:40)  HR: 65 (22 Apr 2019 11:40) (64 - 65)  BP: 170/84 (22 Apr 2019 11:40) (170/84 - 170/84)  BP(mean): --  RR: 18 (22 Apr 2019 11:40) (18 - 18)  SpO2: 98% (22 Apr 2019 11:40) (98% - 100%)    General: WN/WD NAD  Head- Atraumatic, normocephalic   Neurology: A&Ox3, nonfocal, CN II to XII intact, power intact 5/5 in all muscle group  HEENT- PERRLA, moist mucous membrane  Neck-supple, no JVD  Respiratory: Air entry equal b/l, CTA   CVS:  S1S2, no murmurs, rubs or gallops  Abdominal: Soft, NT, ND +BS,   Genitourinary- voiding, non palpable bladder  Extremities: PICC line right upper extremity in place  Skin- multiple skin abrasions bilateral UE/LE  Psychiatric- mood stable   LN- no lymphadenopathy

## 2019-04-22 NOTE — H&P ADULT - NSICDXPASTMEDICALHX_GEN_ALL_CORE_FT
PAST MEDICAL HISTORY:  Arthritis     BPH (Benign Prostatic Hypertrophy)     CAD (Coronary Artery Disease)     Coronary Artery  Stents Pt had stent placement in 2001; and then 1/2/2007 (drug eluting stent)    Depression/ anxiety     Diverticulitis     Dyslipidemia     GERD (Gastroesophageal Reflux Disease)     Gout     HTN (Hypertension)     MI (Myocardial Infarction) 2001, 2007    Obesity     GOLDY (Obstructive Sleep Apnea) sleep study positive (2005), uses CPAP machine

## 2019-04-22 NOTE — ED ADULT TRIAGE NOTE - CHIEF COMPLAINT QUOTE
pt BIBEMS from home c/o difficulty breathing since this AM. pt had R knee drained 2 wks ago. hx htn, has not taken any daily meds. as per EMS pt was 90% on RA upon arrival. 100% on NRB upon arrival to ED.

## 2019-04-22 NOTE — ED PROVIDER NOTE - SKIN, MLM
Skin normal color for race, warm, dry and intact. No evidence of rash. Right lower extremity suture line intact

## 2019-04-22 NOTE — H&P ADULT - NSHPREVIEWOFSYSTEMS_GEN_ALL_CORE
Review of Systems:  CONSTITUTIONAL: No weakness, fevers or chills  EYES/ENT: No visual changes;  No vertigo or throat pain   NECK: No pain or stiffness  RESPIRATORY: No cough, wheezing, hemoptysis; SOB on exertion  CARDIOVASCULAR: No chest pain or palpitations  GASTROINTESTINAL: No abdominal or epigastric pain. No nausea, vomiting, or hematemesis; No diarrhea or constipation.   GENITOURINARY: No dysuria, frequency or hematuria  NEUROLOGICAL: No numbness or weakness  SKIN: No itching, burning, rashes, or lesions   All other review of systems is negative unless indicated above

## 2019-04-22 NOTE — ED PROVIDER NOTE - CONSTITUTIONAL, MLM
normal... Well appearing, well nourished, awake, alert, oriented to person, place, time/situation and in mild to moderate respiratory distress.

## 2019-04-22 NOTE — ED PROVIDER NOTE - OBJECTIVE STATEMENT
72 y/o male with a PMHx of anxiety, arthritis, BPH, CAD, depression, diverticulitis, dyslipidemia, gout, GERD, HTN, MI, GOLDY, obesity, stents, h/o right knee replacement presents to the ED c/o SOB. Pt notes he had right knee replacement 13 years ago. Pt states three weeks ago, pt had right knee pain, fever (103), and urinary incontinence. Pt went to [place] and had an I&D of the knee. Yesterday pt had SOB, decreased PO intake and felt lethargic. Denies cough. No other complaints at this time.

## 2019-04-23 LAB
BASOPHILS # BLD AUTO: 0.14 K/UL — SIGNIFICANT CHANGE UP (ref 0–0.2)
BASOPHILS NFR BLD AUTO: 1.4 % — SIGNIFICANT CHANGE UP (ref 0–2)
EOSINOPHIL # BLD AUTO: 1.05 K/UL — HIGH (ref 0–0.5)
EOSINOPHIL NFR BLD AUTO: 10.5 % — HIGH (ref 0–6)
HCT VFR BLD CALC: 31.1 % — LOW (ref 39–50)
HCV AB S/CO SERPL IA: 0.13 S/CO — SIGNIFICANT CHANGE UP (ref 0–0.99)
HCV AB SERPL-IMP: SIGNIFICANT CHANGE UP
HGB BLD-MCNC: 10.2 G/DL — LOW (ref 13–17)
IMM GRANULOCYTES NFR BLD AUTO: 1.9 % — HIGH (ref 0–1.5)
LYMPHOCYTES # BLD AUTO: 1.91 K/UL — SIGNIFICANT CHANGE UP (ref 1–3.3)
LYMPHOCYTES # BLD AUTO: 19.1 % — SIGNIFICANT CHANGE UP (ref 13–44)
MCHC RBC-ENTMCNC: 30.9 PG — SIGNIFICANT CHANGE UP (ref 27–34)
MCHC RBC-ENTMCNC: 32.8 GM/DL — SIGNIFICANT CHANGE UP (ref 32–36)
MCV RBC AUTO: 94.2 FL — SIGNIFICANT CHANGE UP (ref 80–100)
MONOCYTES # BLD AUTO: 0.74 K/UL — SIGNIFICANT CHANGE UP (ref 0–0.9)
MONOCYTES NFR BLD AUTO: 7.4 % — SIGNIFICANT CHANGE UP (ref 2–14)
NEUTROPHILS # BLD AUTO: 5.95 K/UL — SIGNIFICANT CHANGE UP (ref 1.8–7.4)
NEUTROPHILS NFR BLD AUTO: 59.7 % — SIGNIFICANT CHANGE UP (ref 43–77)
NRBC # BLD: 0 /100 WBCS — SIGNIFICANT CHANGE UP (ref 0–0)
PLATELET # BLD AUTO: 388 K/UL — SIGNIFICANT CHANGE UP (ref 150–400)
RBC # BLD: 3.3 M/UL — LOW (ref 4.2–5.8)
RBC # FLD: 13.1 % — SIGNIFICANT CHANGE UP (ref 10.3–14.5)
WBC # BLD: 9.98 K/UL — SIGNIFICANT CHANGE UP (ref 3.8–10.5)
WBC # FLD AUTO: 9.98 K/UL — SIGNIFICANT CHANGE UP (ref 3.8–10.5)

## 2019-04-23 PROCEDURE — 93306 TTE W/DOPPLER COMPLETE: CPT | Mod: 26

## 2019-04-23 PROCEDURE — 71045 X-RAY EXAM CHEST 1 VIEW: CPT | Mod: 26

## 2019-04-23 RX ORDER — LACTOBACILLUS ACIDOPHILUS 100MM CELL
1 CAPSULE ORAL
Qty: 0 | Refills: 0 | Status: DISCONTINUED | OUTPATIENT
Start: 2019-04-23 | End: 2019-04-24

## 2019-04-23 RX ORDER — HYDRALAZINE HCL 50 MG
50 TABLET ORAL THREE TIMES A DAY
Qty: 0 | Refills: 0 | Status: DISCONTINUED | OUTPATIENT
Start: 2019-04-23 | End: 2019-04-24

## 2019-04-23 RX ORDER — PSYLLIUM SEED (WITH DEXTROSE)
1 POWDER (GRAM) ORAL DAILY
Qty: 0 | Refills: 0 | Status: DISCONTINUED | OUTPATIENT
Start: 2019-04-23 | End: 2019-04-24

## 2019-04-23 RX ORDER — IBUPROFEN 200 MG
400 TABLET ORAL EVERY 6 HOURS
Qty: 0 | Refills: 0 | Status: DISCONTINUED | OUTPATIENT
Start: 2019-04-23 | End: 2019-04-24

## 2019-04-23 RX ORDER — ATORVASTATIN CALCIUM 80 MG/1
20 TABLET, FILM COATED ORAL AT BEDTIME
Qty: 0 | Refills: 0 | Status: DISCONTINUED | OUTPATIENT
Start: 2019-04-23 | End: 2019-04-24

## 2019-04-23 RX ORDER — FUROSEMIDE 40 MG
40 TABLET ORAL ONCE
Qty: 0 | Refills: 0 | Status: COMPLETED | OUTPATIENT
Start: 2019-04-23 | End: 2019-04-23

## 2019-04-23 RX ADMIN — PANTOPRAZOLE SODIUM 40 MILLIGRAM(S): 20 TABLET, DELAYED RELEASE ORAL at 05:16

## 2019-04-23 RX ADMIN — Medication 400 MILLIGRAM(S): at 13:13

## 2019-04-23 RX ADMIN — Medication 40 MILLIGRAM(S): at 10:36

## 2019-04-23 RX ADMIN — Medication 2 MILLIGRAM(S): at 05:16

## 2019-04-23 RX ADMIN — Medication 50 MILLIGRAM(S): at 05:16

## 2019-04-23 RX ADMIN — Medication 1 PACKET(S): at 14:44

## 2019-04-23 RX ADMIN — DAPTOMYCIN 120 MILLIGRAM(S): 500 INJECTION, POWDER, LYOPHILIZED, FOR SOLUTION INTRAVENOUS at 17:01

## 2019-04-23 RX ADMIN — Medication 325 MILLIGRAM(S): at 05:15

## 2019-04-23 RX ADMIN — Medication 1 TABLET(S): at 17:01

## 2019-04-23 RX ADMIN — LOSARTAN POTASSIUM 100 MILLIGRAM(S): 100 TABLET, FILM COATED ORAL at 05:15

## 2019-04-23 RX ADMIN — SERTRALINE 100 MILLIGRAM(S): 25 TABLET, FILM COATED ORAL at 10:37

## 2019-04-23 RX ADMIN — HEPARIN SODIUM 5000 UNIT(S): 5000 INJECTION INTRAVENOUS; SUBCUTANEOUS at 05:16

## 2019-04-23 RX ADMIN — Medication 400 MILLIGRAM(S): at 21:37

## 2019-04-23 RX ADMIN — Medication 325 MILLIGRAM(S): at 17:01

## 2019-04-23 RX ADMIN — Medication 50 MILLIGRAM(S): at 21:37

## 2019-04-23 RX ADMIN — LATANOPROST 1 DROP(S): 0.05 SOLUTION/ DROPS OPHTHALMIC; TOPICAL at 21:37

## 2019-04-23 RX ADMIN — Medication 2 MILLIGRAM(S): at 17:02

## 2019-04-23 RX ADMIN — Medication 50 MILLIGRAM(S): at 14:44

## 2019-04-23 RX ADMIN — Medication 180 MILLIGRAM(S): at 21:37

## 2019-04-23 RX ADMIN — Medication 50 MICROGRAM(S): at 05:16

## 2019-04-23 RX ADMIN — ATORVASTATIN CALCIUM 20 MILLIGRAM(S): 80 TABLET, FILM COATED ORAL at 21:37

## 2019-04-23 RX ADMIN — Medication 400 MILLIGRAM(S): at 22:35

## 2019-04-23 RX ADMIN — HEPARIN SODIUM 5000 UNIT(S): 5000 INJECTION INTRAVENOUS; SUBCUTANEOUS at 19:11

## 2019-04-23 NOTE — CONSULT NOTE ADULT - ASSESSMENT
1. Septic arthritis  - Patient seen and evaluated by Infectious disease.  - All labs and imaging reviewed.  - Patient currently receiving Daptomycin via PICC  - ID will follow  - Will discuss with attending Dr. Oneal. 73 year old M with PMH of CAD s/p stents, Paroxysmal Afib, GOLDY on CPAP, admitted on 4/22 for evaluation of dyspnea on exertion over preceding 2 days. Currently having cardiology evaluation for this. Of note the patient had trauma to face over 50 years ago which required major dental work as a child, most recently he had loosening of the teeth which eventually fell out, subsequently had major oral surgery for implants complicated by infection which did not respond to clindamycin as he thought he was allergic to penicillin which after skin testing proved to not be so. Subsequent treatment with augmentin was successful for his oral infection however he then developed right knee pain with a large effusion which brought him to the attention of his doctors at [facility]. An aspirate was done with a large volume of pus removed and he had a washout with removal of the plastic polymers but retention of the hardware. A picc line was placed and the patient discharged on vancomycin ( he does not know the name of the bacteria.) He developed a rash and antibiotics were changed to daptomycin which is expected to continue for a total of 6 weeks. He had to replace the picc line once after it was wet but has been having no issues with it now. He never had an echocardiogram at [facility] to rule out endocarditis. He was told that if the iv antibiotics do not work he may need further surgery and new knee replacement, this right knee prosthesis was placed in 2006.   1. Patient admitted for evaluation of dyspnea on exertion  - cardiology evaluation in progress  2. Right prosthetic knee infection  - patient to continue daptomycin as ordered and when discharged to go home on his current dose  - to follow up with his id doctors from [facility]  - may need eventual further surgery given that prosthesis is still in place  - CPK wnl  - echocardiogram to rule out vegetations  Will follow

## 2019-04-23 NOTE — PHARMACOTHERAPY INTERVENTION NOTE - INTERVENTION TYPE RECOOMEND
Dose Optimization/Non-renal Dose Adjustment
Therapy Recommended - Formulary adherence
Therapy Recommended - Additional therapy

## 2019-04-23 NOTE — PROGRESS NOTE ADULT - SUBJECTIVE AND OBJECTIVE BOX
c/c: sob    HPI: 73 year old M with PMH of CAD s/p stents (PCI stent LAD (2000), ASMI 2000), Paroxysmal Afib, GOLDY on CPAP, presents with complaint of dyspnea on exertion for 2 days. Patient was recently hospitalized at [place] for Septic arthritis of right knee. Patient was admitted for 1 week and discharged with PICC line in right arm and daily cubicin. Patient states he started having dyspnea on exertion yesterday with walking. Denies chest pain, nausea, vomiting, fever, chills, abdominal pain, knee pain, lower extremity edema. Patient states he had normal stress test and echo recently. Patient's cardiologist is Dr. Ross in [place].    Records obtained from Dr. Ross: Echo 2018: LVEF 60-65%, mild LVH, mild aortic root dilatation, normal LV and sy stolic fxn, no Pulm HTN, trace MR, diastolic dysfxn  - Nuclear stress test 2017: Small, mild, fixed anteroapical defect. No definite ischemia.    4/23: sob much improved; not on O2  Hb 10.2; baseline last yr was 14 ( pt on asa 325 mg po bid); hx of colon polyps in the past      PHYSICAL EXAM:    Daily     Daily     ICU Vital Signs Last 24 Hrs  T(C): 36.6 (23 Apr 2019 10:35), Max: 37.5 (22 Apr 2019 14:53)  T(F): 97.9 (23 Apr 2019 10:35), Max: 99.5 (22 Apr 2019 14:53)  HR: 59 (23 Apr 2019 10:35) (57 - 78)  BP: 152/59 (23 Apr 2019 10:35) (135/55 - 170/84)  BP(mean): --  ABP: --  ABP(mean): --  RR: 17 (23 Apr 2019 10:35) (17 - 18)  SpO2: 95% (23 Apr 2019 10:35) (93% - 98%)      Constitutional: Well appearing  HEENT: Atraumatic, GARRET, Normal, No congestion  Respiratory: Breath Sounds normal, no rhonchi/wheeze  Cardiovascular: N S1S2;   Gastrointestinal: Abdomen soft, non tender, Bowel Sounds present  Extremities: right lrg edema, right knee surgical anai; peripheral pulses present  Neurological: AAO x 3, no gross focal motor deficits  Skin: Non cellulitic, no rash, ulcers  Lymph Nodes: No lymphadenopathy noted  Back: No CVA tenderness   Musculoskeletal: non tender  Breasts: Deferred  Genitourinary: deferred  Rectal: Deferred                          10.2   9.98  )-----------( 388      ( 23 Apr 2019 05:40 )             31.1       CBC Full  -  ( 23 Apr 2019 05:40 )  WBC Count : 9.98 K/uL  RBC Count : 3.30 M/uL  Hemoglobin : 10.2 g/dL  Hematocrit : 31.1 %  Platelet Count - Automated : 388 K/uL  Mean Cell Volume : 94.2 fl  Mean Cell Hemoglobin : 30.9 pg  Mean Cell Hemoglobin Concentration : 32.8 gm/dL  Auto Neutrophil # : 5.95 K/uL  Auto Lymphocyte # : 1.91 K/uL  Auto Monocyte # : 0.74 K/uL  Auto Eosinophil # : 1.05 K/uL  Auto Basophil # : 0.14 K/uL  Auto Neutrophil % : 59.7 %  Auto Lymphocyte % : 19.1 %  Auto Monocyte % : 7.4 %  Auto Eosinophil % : 10.5 %  Auto Basophil % : 1.4 %      04-22    141  |  104  |  13  ----------------------------<  108<H>  3.9   |  29  |  0.81    Ca    8.5      22 Apr 2019 09:39    TPro  7.6  /  Alb  3.2<L>  /  TBili  0.5  /  DBili  x   /  AST  32  /  ALT  47  /  AlkPhos  70  04-22      LIVER FUNCTIONS - ( 22 Apr 2019 09:39 )  Alb: 3.2 g/dL / Pro: 7.6 gm/dL / ALK PHOS: 70 U/L / ALT: 47 U/L / AST: 32 U/L / GGT: x             PT/INR - ( 22 Apr 2019 09:39 )   PT: 13.2 sec;   INR: 1.18 ratio         PTT - ( 22 Apr 2019 09:39 )  PTT:30.5 sec    CARDIAC MARKERS ( 22 Apr 2019 22:24 )  0.064 ng/mL / x     / x     / x     / x      CARDIAC MARKERS ( 22 Apr 2019 16:18 )  x     / x     / 90 U/L / x     / x      CARDIAC MARKERS ( 22 Apr 2019 12:16 )  0.062 ng/mL / x     / x     / x     / x      CARDIAC MARKERS ( 22 Apr 2019 09:39 )  0.087 ng/mL / x     / x     / x     / x                    MEDICATIONS  (STANDING):  align 4 milliGRAM(s)   Oral daily  aspirin 325 milliGRAM(s) Oral two times a day  Citrucel 500 mG/Dose   Chew two times a day  DAPTOmycin IVPB 500 milliGRAM(s) IV Intermittent every 24 hours  heparin  Injectable 5000 Unit(s) SubCutaneous every 12 hours  hydrALAZINE 50 milliGRAM(s) Oral daily  latanoprost 0.005% Ophthalmic Solution 1 Drop(s) Both EYES at bedtime  levothyroxine 50 MICROGram(s) Oral daily  loperamide 2 milliGRAM(s) Oral two times a day  losartan 100 milliGRAM(s) Oral daily  pantoprazole    Tablet 40 milliGRAM(s) Oral before breakfast  sertraline 100 milliGRAM(s) Oral daily  verapamil  milliGRAM(s) Oral at bedtime

## 2019-04-23 NOTE — CONSULT NOTE ADULT - ASSESSMENT
73 year old man with the above history admitted with shortness of breath, mildly elevated flat troponins, an elevated BNP and a normal Ekg.  He also has evidence for a possible bronchitis as seen on CT scan.  The options are to continue to treat him with antibiotics and set up an inpatient nuclear stress test or to perform an inpatient cardiac catheterization.  Will discuss with the patient. 73 year old man with the above history admitted with shortness of breath(over many hours), mildly elevated flat troponins, an elevated BNP and a normal Ekg.  He also has evidence for a possible bronchitis as seen on CT scan.  I do not feel this represents ACS.  It may be related to CHF.  Will give one dose of IV furosemide.  Continue antibiotics.  Will order a TTE.

## 2019-04-23 NOTE — PROGRESS NOTE ADULT - ASSESSMENT
73 year old M with PMH of CAD s/p stents (LAD 2001, 2007 drug eluting stent), GOLDY on CPAP, presents with complaint of dyspnea on exertion for 2 days.    #) Dyspnea on exertion, Elevated Troponin: Mildly elevated troponin now downtrending. No CP, no current dyspnea. Pt recent treatment for Septic arthritis on abx treatment. Kendra score 97. AMY risk score 20 (low). No clinical evidence of HF. PE/DVT ruled out with CTA and doppler.  - Admitted to Telemetry  - Cardiology Consulted:  Continue ASA, Start Plavix; Pt declined Plavix. iv Lasix 40 mg  given on 4/23, f/u  - Trend Troponin (0.087 -> 0.062)  - awaiting echo to r/o CHF    #) Acute anemia: r/o GI cause  check FOBT  repeat cbc in am    #) Septic Arthritis: Leukocytosis  - Continue ABX through PICC  - CBC in AM    #) GOLDY on CPAP  - CPAP qhs    #) HTN  - Continue medications    #) DVT ppx  - Heparin    poc discussed at length with pt, team

## 2019-04-23 NOTE — CONSULT NOTE ADULT - SUBJECTIVE AND OBJECTIVE BOX
CHIEF COMPLAINT: Patient is a 73y old  Male who presents with a chief complaint of Dyspnea on exertion, Elevated Troponin (22 Apr 2019 13:51)      HPI: HPI:  73 year old M with PMH of CAD s/p stents (PCI stent LAD (2000), ASMI 2000), Paroxysmal Afib, GOLDY on CPAP, presents with complaint of dyspnea on exertion for 2 days. Patient was recently hospitalized at [place] for Septic arthritis of right knee. Patient was admitted for 1 week and discharged with PICC line in right arm and daily cubicin. Patient states he started having dyspnea on exertion yesterday with walking. Denies chest pain, nausea, vomiting, fever, chills, abdominal pain, knee pain, lower extremity edema. Patient states he had normal stress test and echo recently. Patient's cardiologist is Dr. Ross in [place].    Records obtained from Dr. Ross: Echo 2018: LVEF 60-65%, mild LVH, mild aortic root dilatation, normal LV and systolic fxn, no Pulm HTN, trace MR, diastolic dysfxn  - Nuclear stress test 2017: Small, mild, fixed anteroapical defect. No definite ischemia. (22 Apr 2019 13:51)      PMHx: PAST MEDICAL & SURGICAL HISTORY:  Gout  HTN (Hypertension)  BPH (Benign Prostatic Hypertrophy)  MI (Myocardial Infarction): 2001, 2007  Diverticulitis  GERD (Gastroesophageal Reflux Disease)  Depression/ anxiety  Arthritis  Dyslipidemia  Coronary Artery  Stents: Pt had stent placement in 2001; and then 1/2/2007 (drug eluting stent)  CAD (Coronary Artery Disease)  Obesity  GOLDY (Obstructive Sleep Apnea): sleep study positive (2005), uses CPAP machine  S/P Coronary Artery Stent Placement: Drug eluting stent stent placement   1/2/2007 Diagonal #1  S/P Coronary Artery Stent Placement: 9/6/2000 in  LAD  Shoulder Arthroscopy: 1983 right shoulder  Knee Arthroscopy: bilat  knees  x3 between [id number]  History of Colon Resection: sigmoid colon resection 2009 Diverticulitis  History of Colon Resection  History of Total Shoulder Replacement: right shoulder 9/9/05  History of Total Knee Replacement: 3/1/04  History of Total Knee Replacement: Right knee 5/24/11        Soc Hx:     FAMILY HISTORY:      Allergies: Allergies    Cipro I.V. (Flushing (Skin))  Keflex (Rash)  penicillin (Other)    Intolerances          REVIEW OF SYSTEMS:    As above  No chest pain + shortness of breath  No lightheadeness or syncope  No leg swelling  No palpitations  No claudication-like symptoms    Vital Signs Last 24 Hrs  T(C): 36.4 (23 Apr 2019 04:59), Max: 37.5 (22 Apr 2019 14:53)  T(F): 97.6 (23 Apr 2019 04:59), Max: 99.5 (22 Apr 2019 14:53)  HR: 57 (23 Apr 2019 04:59) (57 - 78)  BP: 135/55 (23 Apr 2019 04:59) (135/55 - 170/84)  BP(mean): --  RR: 18 (22 Apr 2019 19:49) (17 - 18)  SpO2: 93% (23 Apr 2019 04:59) (93% - 100%)    I&O's Summary      CAPILLARY BLOOD GLUCOSE          PHYSICAL EXAM:   Patient in NAD  Neck: No JVD; Carotids:  2+ without bruits  Respiratory:  Clear to A&P  Cardiovascular: S1 and S2, regular rate and rhythm, no Murmurs, gallops or rubs  Gastrointestinal:  Soft, non-tender; BS positive  Extremities: No peripheral edema  Vascular: 2+ peripheral pulses  Neurological: A/O x 3, no focal deficits      MEDICATIONS:  MEDICATIONS  (STANDING):  align 4 milliGRAM(s)   Oral daily  aspirin 325 milliGRAM(s) Oral two times a day  Citrucel 500 mG/Dose   Chew two times a day  DAPTOmycin IVPB 500 milliGRAM(s) IV Intermittent every 24 hours  heparin  Injectable 5000 Unit(s) SubCutaneous every 12 hours  hydrALAZINE 50 milliGRAM(s) Oral daily  latanoprost 0.005% Ophthalmic Solution 1 Drop(s) Both EYES at bedtime  levothyroxine 50 MICROGram(s) Oral daily  loperamide 2 milliGRAM(s) Oral two times a day  losartan 100 milliGRAM(s) Oral daily  pantoprazole    Tablet 40 milliGRAM(s) Oral before breakfast  sertraline 100 milliGRAM(s) Oral daily  verapamil  milliGRAM(s) Oral at bedtime      LABS: All Labs Reviewed:  Blood Culture:   BNP Serum Pro-Brain Natriuretic Peptide: 1598 pg/mL (04-22 @ 16:18)    CBC             WBC Count: 9.98 K/uL (04-23 @ 05:40)  WBC Count: 12.91 K/uL (04-22 @ 09:39)              Hemoglobin: 10.2 g/dL (04-23 @ 05:40)  Hemoglobin: 11.1 g/dL (04-22 @ 09:39)              Hematocrit: 31.1 % (04-23 @ 05:40)  Hematocrit: 33.4 % (04-22 @ 09:39)              Mean Cell Volume: 94.2 fl (04-23 @ 05:40)  Mean Cell Volume: 93.6 fl (04-22 @ 09:39)              Platelet Count - Automated: 388 K/uL (04-23 @ 05:40)  Platelet Count - Automated: 432 K/uL (04-22 @ 09:39)                            Cardiac markers             Troponin I, Serum: 0.064 ng/mL (04-22 @ 22:24)  Troponin I, Serum: 0.062 ng/mL (04-22 @ 12:16)  Troponin I, Serum: 0.087 ng/mL (04-22 @ 09:39)                             Chems        Sodium, Serum: 141 mmol/L (04-22 @ 09:39)          Potassium, Serum: 3.9 mmol/L (04-22 @ 09:39)          Blood Urea Nitrogen, Serum: 13 mg/dL (04-22 @ 09:39)          Creatinine 0.81                  Protein Total, Serum: 7.6 gm/dL (04-22 @ 09:39)                  Calcium, Total Serum: 8.5 mg/dL (04-22 @ 09:39)                  Bilirubin Total, Serum: 0.5 mg/dL (04-22 @ 09:39)          Alanine Aminotransferase (ALT/SGPT): 47 U/L (04-22 @ 09:39)          Aspartate Aminotransferase (AST/SGOT): 32 U/L (04-22 @ 09:39)                 INR: 1.18 ratio (04-22 @ 09:39)             RADIOLOGY: < from: CT Angio Chest PE Protocol w/ IV Cont (04.22.19 @ 11:11) >  IMPRESSION:     No pulmonary embolism.  Trace bilateral pleural effusions.  Right lower lobebronchial wall thickening raising the possibility of   bronchitis.  < from: US Duplex Venous Lower Ext Ltd, Right (04.22.19 @ 10:56) >  IMPRESSION:     No evidence of right lower extremity deep venous thrombosis.      < end of copied text >                      BECKY HOLLAND     < end of copied text >      EKG:  Normal Ekg    Telemetry: Sinus    ECHO:

## 2019-04-23 NOTE — CONSULT NOTE ADULT - SUBJECTIVE AND OBJECTIVE BOX
Patient is a 73y old  Male who presents with a chief complaint of Dyspnea on exertion, Elevated Troponin (23 Apr 2019 11:24)    HPI:  73 year old M with PMH of CAD s/p stents (PCI stent LAD (2000), ASMI 2000), Paroxysmal Afib, GOLDY on CPAP, presents with complaint of dyspnea on exertion for 2 days. Patient was recently hospitalized at [facility] for Septic arthritis of right knee. Patient was admitted for 1 week and discharged with PICC line in right arm and daily cubicin. Patient states he started having dyspnea on exertion yesterday with walking. Denies chest pain, nausea, vomiting, fever, chills, abdominal pain, knee pain, lower extremity edema. Patient states he had normal stress test and echo recently. Patient's cardiologist is Dr. Ross in [facility] [place] [facility].    Records obtained from Dr. Ross: Echo 2018: LVEF 60-65%, mild LVH, mild aortic root dilatation, normal LV and sy stolic fxn, no Pulm HTN, trace MR, diastolic dysfxn  - Nuclear stress test 2017: Small, mild, fixed anteroapical defect. No definite ischemia. (22 Apr 2019 13:51)    Patient seen today at bedside, in NAD. Patient states sutures overlying post-op site of Incision and Drainage of R knee were planned to be removed today.     PAST MEDICAL & SURGICAL HISTORY:  Gout  HTN (Hypertension)  BPH (Benign Prostatic Hypertrophy)  MI (Myocardial Infarction): 2001, 2007  Diverticulitis  GERD (Gastroesophageal Reflux Disease)  Depression/ anxiety  Arthritis  Dyslipidemia  Coronary Artery  Stents: Pt had stent placement in 2001; and then 1/2/2007 (drug eluting stent)  CAD (Coronary Artery Disease)  Obesity  GOLDY (Obstructive Sleep Apnea): sleep study positive (2005), uses CPAP machine  S/P Coronary Artery Stent Placement: Drug eluting stent stent placement   1/2/2007 Diagonal #1  S/P Coronary Artery Stent Placement: 9/6/2000 in  LAD  Shoulder Arthroscopy: 1983 right shoulder  Knee Arthroscopy: bilat  knees  x3 between [id number]  History of Colon Resection: sigmoid colon resection 2009 Diverticulitis  History of Colon Resection  History of Total Shoulder Replacement: right shoulder 9/9/05  History of Total Knee Replacement: 3/1/04  History of Total Knee Replacement: Right knee 5/24/11    MEDICATIONS  (STANDING):  aspirin 325 milliGRAM(s) Oral two times a day  atorvastatin 20 milliGRAM(s) Oral at bedtime  DAPTOmycin IVPB 500 milliGRAM(s) IV Intermittent every 24 hours  heparin  Injectable 5000 Unit(s) SubCutaneous every 12 hours  hydrALAZINE 50 milliGRAM(s) Oral three times a day  ibuprofen  Tablet. 400 milliGRAM(s) Oral every 6 hours  lactobacillus acidophilus 1 Tablet(s) Oral two times a day  latanoprost 0.005% Ophthalmic Solution 1 Drop(s) Both EYES at bedtime  levothyroxine 50 MICROGram(s) Oral daily  loperamide 2 milliGRAM(s) Oral two times a day  losartan 100 milliGRAM(s) Oral daily  pantoprazole    Tablet 40 milliGRAM(s) Oral before breakfast  psyllium Powder 1 Packet(s) Oral daily  sertraline 100 milliGRAM(s) Oral daily  verapamil  milliGRAM(s) Oral at bedtime    MEDICATIONS  (PRN):  acetaminophen   Tablet .. 650 milliGRAM(s) Oral every 6 hours PRN Temp greater or equal to 38C (100.4F), Mild Pain (1 - 3)    Allergies    Cipro I.V. 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  FAMILY HISTORY:     no history of premature cardiovascular disease in first degree relatives    ROS: the patient denies fever, no chills, no HA, no dizziness, no sore throat, no blurry vision, no CP, no palpitations, no SOB, no cough, no abdominal pain, no diarrhea, no N/V, no dysuria, no leg pain, no claudication, no rash, no joint aches, no rectal pain or bleeding, no night sweats  All other systems reviewed and are negative    Vital Signs Last 24 Hrs  T(C): 36.6 (23 Apr 2019 10:35), Max: 37.5 (22 Apr 2019 14:53)  T(F): 97.9 (23 Apr 2019 10:35), Max: 99.5 (22 Apr 2019 14:53)  HR: 59 (23 Apr 2019 10:35) (57 - 78)  BP: 152/59 (23 Apr 2019 10:35) (135/55 - 166/70)  BP(mean): --  RR: 17 (23 Apr 2019 10:35) (17 - 18)  SpO2: 95% (23 Apr 2019 10:35) (93% - 98%)  Daily     Daily     PE:    Constitutional: frail looking  HEENT: NC/AT, EOMI, PERRLA, conjunctivae clear; ears and nose atraumatic; pharynx benign  Neck: supple; thyroid not palpable  Back: no tenderness  Respiratory: respiratory effort normal; clear to auscultation  Cardiovascular: S1S2 regular, no murmurs  Abdomen: soft, not tender, not distended, positive BS; liver and spleen WNL  Genitourinary: no suprapubic tenderness  Lymphatic: no LN palpable  Musculoskeletal: no muscle tenderness, no joint swelling or tenderness  Extremities: no pedal edema  Neurological/ Psychiatric: AxOx3, Judgement and insight normal;  moving all extremities  Skin: sutures overlying surgical site s/p R knee incision and drainage.     Labs: all available labs reviewed                        10.2   9.98  )-----------( 388      ( 23 Apr 2019 05:40 )             31.1     04-22    141  |  104  |  13  ----------------------------<  108<H>  3.9   |  29  |  0.81    Ca    8.5      22 Apr 2019 09:39    TPro  7.6  /  Alb  3.2<L>  /  TBili  0.5  /  DBili  x   /  AST  32  /  ALT  47  /  AlkPhos  70  04-22     LIVER FUNCTIONS - ( 22 Apr 2019 09:39 )  Alb: 3.2 g/dL / Pro: 7.6 gm/dL / ALK PHOS: 70 U/L / ALT: 47 U/L / AST: 32 U/L / GGT: x           RAD:  < from: CT Angio Chest PE Protocol w/ IV Cont (04.22.19 @ 11:11) >  IMPRESSION:     No pulmonary embolism.  Trace bilateral pleural effusions.  Right lower lobebronchial wall thickening raising the possibility of   bronchitis.    < end of copied text >    < from: US Duplex Venous Lower Ext Ltd, Right (04.22.19 @ 10:56) >  IMPRESSION:     No evidence of right lower extremity deep venous thrombosis.    < end of copied text > Patient is a 73y old  Male who presents with a chief complaint of Dyspnea on exertion, Elevated Troponin (23 Apr 2019 11:24)    HPI:  73 year old M with PMH of CAD s/p stents, Paroxysmal Afib, GOLDY on CPAP, admitted on 4/22 for evaluation of dyspnea on exertion over preceding 2 days. Currently having cardiology evaluation for this. Of note the patient had trauma to face over 50 years ago which required major dental work as a child, most recently he had loosening of the teeth which eventually fell out, subsequently had major oral surgery for implants complicated by infection which did not respond to clindamycin as he thought he was allergic to penicillin which after skin testing proved to not be so. Subsequent treatment with augmentin was successful for his oral infection however he then developed right knee pain with a large effusion which brought him to the attention of his doctors at [facility]. An aspirate was done with a large volume of pus removed and he had a washout with removal of the plastic polymers but retention of the hardware. A picc line was placed and the patient discharged on vancomycin ( he does not know the name of the bacteria.) He developed a rash and antibiotics were changed to daptomycin which is expected to continue for a total of 6 weeks. He had to replace the picc line once after it was wet but has been having no issues with it now. He never had an echocardiogram at [facility] to rule out endocarditis. He was told that if the iv antibiotics do not work he may need further surgery and new knee replacement, this right knee prosthesis was placed in 2006.         PAST MEDICAL & SURGICAL HISTORY:  Gout  HTN (Hypertension)  BPH (Benign Prostatic Hypertrophy)  MI (Myocardial Infarction): 2001, 2007  Diverticulitis  GERD (Gastroesophageal Reflux Disease)  Depression/ anxiety  Arthritis  Dyslipidemia  Coronary Artery  Stents: Pt had stent placement in 2001; and then 1/2/2007 (drug eluting stent)  CAD (Coronary Artery Disease)  Obesity  GOLDY (Obstructive Sleep Apnea): sleep study positive (2005), uses CPAP machine  S/P Coronary Artery Stent Placement: Drug eluting stent stent placement   1/2/2007 Diagonal #1  S/P Coronary Artery Stent Placement: 9/6/2000 in  LAD  Shoulder Arthroscopy: 1983 right shoulder  Knee Arthroscopy: bilat  knees  x3 between [id number]  History of Colon Resection: sigmoid colon resection 2009 Diverticulitis  History of Colon Resection  History of Total Shoulder Replacement: right shoulder 9/9/05  History of Total Knee Replacement: 3/1/04  History of Total Knee Replacement: Right knee 5/24/11    MEDICATIONS  (STANDING):  aspirin 325 milliGRAM(s) Oral two times a day  atorvastatin 20 milliGRAM(s) Oral at bedtime  DAPTOmycin IVPB 500 milliGRAM(s) IV Intermittent every 24 hours  heparin  Injectable 5000 Unit(s) SubCutaneous every 12 hours  hydrALAZINE 50 milliGRAM(s) Oral three times a day  ibuprofen  Tablet. 400 milliGRAM(s) Oral every 6 hours  lactobacillus acidophilus 1 Tablet(s) Oral two times a day  latanoprost 0.005% Ophthalmic Solution 1 Drop(s) Both EYES at bedtime  levothyroxine 50 MICROGram(s) Oral daily  loperamide 2 milliGRAM(s) Oral two times a day  losartan 100 milliGRAM(s) Oral daily  pantoprazole    Tablet 40 milliGRAM(s) Oral before breakfast  psyllium Powder 1 Packet(s) Oral daily  sertraline 100 milliGRAM(s) Oral daily  verapamil  milliGRAM(s) Oral at bedtime    MEDICATIONS  (PRN):  acetaminophen   Tablet .. 650 milliGRAM(s) Oral every 6 hours PRN Temp greater or equal to 38C (100.4F), Mild Pain (1 - 3)    Allergies    Cipro I.V. 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; works as malpractice   FAMILY HISTORY:     no history of premature cardiovascular disease in first degree relatives    ROS: the patient denies fever, no chills, no HA, no dizziness, no sore throat, no blurry vision, no CP, no palpitations,  no cough, no abdominal pain, no diarrhea, no N/V, no dysuria, no leg pain, no claudication, no rash, no joint aches, no rectal pain or bleeding, no night sweats  All other systems reviewed and are negative    Vital Signs Last 24 Hrs  T(C): 36.6 (23 Apr 2019 10:35), Max: 37.5 (22 Apr 2019 14:53)  T(F): 97.9 (23 Apr 2019 10:35), Max: 99.5 (22 Apr 2019 14:53)  HR: 59 (23 Apr 2019 10:35) (57 - 78)  BP: 152/59 (23 Apr 2019 10:35) (135/55 - 166/70)  BP(mean): --  RR: 17 (23 Apr 2019 10:35) (17 - 18)  SpO2: 95% (23 Apr 2019 10:35) (93% - 98%)  Daily     Daily     PE:    Constitutional: nontoxic appearing  HEENT: NC/AT, EOMI, PERRLA, conjunctivae clear; ears and nose atraumatic; pharynx benign  Neck: supple; thyroid not palpable  Respiratory: respiratory effort normal; clear to auscultation  Cardiovascular: S1S2 regular, no murmurs  Abdomen: soft, not tender, not distended, positive BS; liver and spleen WNL  Genitourinary: no suprapubic tenderness  Musculoskeletal: no muscle tenderness, no joint swelling or tenderness; right knee sutures intact; full range of motion; right upper extremity with picc intact  Extremities: no pedal edema  Neurological/ Psychiatric: AxOx3, Judgement and insight normal;  moving all extremities  Skin: sutures overlying surgical site s/p R knee incision and drainage.     Labs: all available labs reviewed                        10.2   9.98  )-----------( 388      ( 23 Apr 2019 05:40 )             31.1     04-22    141  |  104  |  13  ----------------------------<  108<H>  3.9   |  29  |  0.81    Ca    8.5      22 Apr 2019 09:39    TPro  7.6  /  Alb  3.2<L>  /  TBili  0.5  /  DBili  x   /  AST  32  /  ALT  47  /  AlkPhos  70  04-22     LIVER FUNCTIONS - ( 22 Apr 2019 09:39 )  Alb: 3.2 g/dL / Pro: 7.6 gm/dL / ALK PHOS: 70 U/L / ALT: 47 U/L / AST: 32 U/L / GGT: x           RAD:  < from: CT Angio Chest PE Protocol w/ IV Cont (04.22.19 @ 11:11) >  IMPRESSION:     No pulmonary embolism.  Trace bilateral pleural effusions.  Right lower lobebronchial wall thickening raising the possibility of   bronchitis.    < end of copied text >    < from: US Duplex Venous Lower Ext Ltd, Right (04.22.19 @ 10:56) >  IMPRESSION:     No evidence of right lower extremity deep venous thrombosis.    < end of copied text >

## 2019-04-24 ENCOUNTER — TRANSCRIPTION ENCOUNTER (OUTPATIENT)
Age: 74
End: 2019-04-24

## 2019-04-24 VITALS
RESPIRATION RATE: 17 BRPM | HEART RATE: 56 BPM | DIASTOLIC BLOOD PRESSURE: 58 MMHG | SYSTOLIC BLOOD PRESSURE: 147 MMHG | TEMPERATURE: 98 F | OXYGEN SATURATION: 96 %

## 2019-04-24 LAB
HCT VFR BLD CALC: 31.9 % — LOW (ref 39–50)
HGB BLD-MCNC: 10.5 G/DL — LOW (ref 13–17)
MCHC RBC-ENTMCNC: 31.3 PG — SIGNIFICANT CHANGE UP (ref 27–34)
MCHC RBC-ENTMCNC: 32.9 GM/DL — SIGNIFICANT CHANGE UP (ref 32–36)
MCV RBC AUTO: 94.9 FL — SIGNIFICANT CHANGE UP (ref 80–100)
NRBC # BLD: 0 /100 WBCS — SIGNIFICANT CHANGE UP (ref 0–0)
PLATELET # BLD AUTO: 354 K/UL — SIGNIFICANT CHANGE UP (ref 150–400)
RBC # BLD: 3.36 M/UL — LOW (ref 4.2–5.8)
RBC # FLD: 13.2 % — SIGNIFICANT CHANGE UP (ref 10.3–14.5)
WBC # BLD: 9.6 K/UL — SIGNIFICANT CHANGE UP (ref 3.8–10.5)
WBC # FLD AUTO: 9.6 K/UL — SIGNIFICANT CHANGE UP (ref 3.8–10.5)

## 2019-04-24 RX ADMIN — SERTRALINE 100 MILLIGRAM(S): 25 TABLET, FILM COATED ORAL at 10:45

## 2019-04-24 RX ADMIN — Medication 50 MICROGRAM(S): at 05:30

## 2019-04-24 RX ADMIN — Medication 400 MILLIGRAM(S): at 05:30

## 2019-04-24 RX ADMIN — Medication 325 MILLIGRAM(S): at 05:30

## 2019-04-24 RX ADMIN — Medication 2 MILLIGRAM(S): at 05:31

## 2019-04-24 RX ADMIN — PANTOPRAZOLE SODIUM 40 MILLIGRAM(S): 20 TABLET, DELAYED RELEASE ORAL at 05:32

## 2019-04-24 RX ADMIN — Medication 50 MILLIGRAM(S): at 05:31

## 2019-04-24 RX ADMIN — Medication 400 MILLIGRAM(S): at 06:18

## 2019-04-24 RX ADMIN — LOSARTAN POTASSIUM 100 MILLIGRAM(S): 100 TABLET, FILM COATED ORAL at 05:32

## 2019-04-24 RX ADMIN — HEPARIN SODIUM 5000 UNIT(S): 5000 INJECTION INTRAVENOUS; SUBCUTANEOUS at 05:32

## 2019-04-24 RX ADMIN — Medication 400 MILLIGRAM(S): at 10:45

## 2019-04-24 RX ADMIN — Medication 1 TABLET(S): at 05:32

## 2019-04-24 NOTE — DISCHARGE NOTE NURSING/CASE MANAGEMENT/SOCIAL WORK - [email or web address]_GEN_[email or web address]_CORE
You can access the [facility] [facility] Patient Portal, offered by [facility], by registering with the following website: [email or web address]

## 2019-04-24 NOTE — PROGRESS NOTE ADULT - SUBJECTIVE AND OBJECTIVE BOX
CHIEF COMPLAINT: Patient is a 73y old  Male who presents with a chief complaint of Dyspnea on exertion, Elevated Troponin (22 Apr 2019 13:51)      HPI: HPI:  73 year old M with PMH of CAD s/p stents (PCI stent LAD (2000), ASMI 2000), Paroxysmal Afib, GOLDY on CPAP, presents with complaint of dyspnea on exertion for 2 days. Patient was recently hospitalized at [place] for Septic arthritis of right knee. Patient was admitted for 1 week and discharged with PICC line in right arm and daily cubicin. Patient states he started having dyspnea on exertion yesterday with walking. Denies chest pain, nausea, vomiting, fever, chills, abdominal pain, knee pain, lower extremity edema. Patient states he had normal stress test and echo recently. Patient's cardiologist is Dr. Ross in [place].    Records obtained from Dr. Ross: Echo 2018: LVEF 60-65%, mild LVH, mild aortic root dilatation, normal LV and systolic fxn, no Pulm HTN, trace MR, diastolic dysfxn  - Nuclear stress test 2017: Small, mild, fixed anteroapical defect. No definite ischemia. (22 Apr 2019 13:51)      PMHx: PAST MEDICAL & SURGICAL HISTORY:  Gout  HTN (Hypertension)  BPH (Benign Prostatic Hypertrophy)  MI (Myocardial Infarction): 2001, 2007  Diverticulitis  GERD (Gastroesophageal Reflux Disease)  Depression/ anxiety  Arthritis  Dyslipidemia  Coronary Artery  Stents: Pt had stent placement in 2001; and then 1/2/2007 (drug eluting stent)  CAD (Coronary Artery Disease)  Obesity  GOLDY (Obstructive Sleep Apnea): sleep study positive (2005), uses CPAP machine  S/P Coronary Artery Stent Placement: Drug eluting stent stent placement   1/2/2007 Diagonal #1  S/P Coronary Artery Stent Placement: 9/6/2000 in  LAD  Shoulder Arthroscopy: 1983 right shoulder  Knee Arthroscopy: bilat  knees  x3 between [id number]  History of Colon Resection: sigmoid colon resection 2009 Diverticulitis  History of Colon Resection  History of Total Shoulder Replacement: right shoulder 9/9/05  History of Total Knee Replacement: 3/1/04  History of Total Knee Replacement: Right knee 5/24/11        Soc Hx:     FAMILY HISTORY:      Allergies: Allergies    Cipro I.V. (Flushing (Skin))  Keflex (Rash)  penicillin (Other)    Intolerances          REVIEW OF SYSTEMS:    As above  No chest pain or shortness of breath  No lightheadeness or syncope  No leg swelling  No palpitations  No claudication-like symptoms    ICU Vital Signs Last 24 Hrs  T(C): 36.4 (24 Apr 2019 04:28), Max: 36.9 (23 Apr 2019 16:40)  T(F): 97.5 (24 Apr 2019 04:28), Max: 98.4 (23 Apr 2019 16:40)  HR: 56 (24 Apr 2019 04:28) (56 - 66)  BP: 147/58 (24 Apr 2019 04:28) (142/69 - 170/73)  BP(mean): --  ABP: --  ABP(mean): --  RR: 17 (24 Apr 2019 04:28) (16 - 17)  SpO2: 96% (24 Apr 2019 04:28) (93% - 96%)      I&O's Summary      CAPILLARY BLOOD GLUCOSE          PHYSICAL EXAM:   Patient in NAD  Neck: No JVD; Carotids:  2+ without bruits  Respiratory:  Clear to A&P  Cardiovascular: S1 and S2, regular rate and rhythm, no Murmurs, gallops or rubs  Gastrointestinal:  Soft, non-tender; BS positive  Extremities: No peripheral edema  Vascular: 2+ peripheral pulses  Neurological: A/O x 3, no focal deficits      MEDICATIONS  (STANDING):  aspirin 325 milliGRAM(s) Oral two times a day  atorvastatin 20 milliGRAM(s) Oral at bedtime  DAPTOmycin IVPB 500 milliGRAM(s) IV Intermittent every 24 hours  heparin  Injectable 5000 Unit(s) SubCutaneous every 12 hours  hydrALAZINE 50 milliGRAM(s) Oral three times a day  ibuprofen  Tablet. 400 milliGRAM(s) Oral every 6 hours  lactobacillus acidophilus 1 Tablet(s) Oral two times a day  latanoprost 0.005% Ophthalmic Solution 1 Drop(s) Both EYES at bedtime  levothyroxine 50 MICROGram(s) Oral daily  loperamide 2 milliGRAM(s) Oral two times a day  losartan 100 milliGRAM(s) Oral daily  pantoprazole    Tablet 40 milliGRAM(s) Oral before breakfast  psyllium Powder 1 Packet(s) Oral daily  sertraline 100 milliGRAM(s) Oral daily  verapamil  milliGRAM(s) Oral at bedtime        LABS: All Labs Reviewed:  Blood Culture:   BNP Serum Pro-Brain Natriuretic Peptide: 1598 pg/mL (04-22 @ 16:18)    CBC             WBC Count: 9.98 K/uL (04-23 @ 05:40)  WBC Count: 12.91 K/uL (04-22 @ 09:39)              Hemoglobin: 10.2 g/dL (04-23 @ 05:40)  Hemoglobin: 11.1 g/dL (04-22 @ 09:39)              Hematocrit: 31.1 % (04-23 @ 05:40)  Hematocrit: 33.4 % (04-22 @ 09:39)              Mean Cell Volume: 94.2 fl (04-23 @ 05:40)  Mean Cell Volume: 93.6 fl (04-22 @ 09:39)              Platelet Count - Automated: 388 K/uL (04-23 @ 05:40)  Platelet Count - Automated: 432 K/uL (04-22 @ 09:39)                        10.5   9.60  )-----------( 354      ( 24 Apr 2019 05:26 )             31.9                               Cardiac markers             Troponin I, Serum: 0.064 ng/mL (04-22 @ 22:24)  Troponin I, Serum: 0.062 ng/mL (04-22 @ 12:16)  Troponin I, Serum: 0.087 ng/mL (04-22 @ 09:39)                             Chems        Sodium, Serum: 141 mmol/L (04-22 @ 09:39)          Potassium, Serum: 3.9 mmol/L (04-22 @ 09:39)          Blood Urea Nitrogen, Serum: 13 mg/dL (04-22 @ 09:39)          Creatinine 0.81                  Protein Total, Serum: 7.6 gm/dL (04-22 @ 09:39)                  Calcium, Total Serum: 8.5 mg/dL (04-22 @ 09:39)                  Bilirubin Total, Serum: 0.5 mg/dL (04-22 @ 09:39)          Alanine Aminotransferase (ALT/SGPT): 47 U/L (04-22 @ 09:39)          Aspartate Aminotransferase (AST/SGOT): 32 U/L (04-22 @ 09:39)                                INR: 1.18 ratio (04-22 @ 09:39)             RADIOLOGY: < from: CT Angio Chest PE Protocol w/ IV Cont (04.22.19 @ 11:11) >  IMPRESSION:     No pulmonary embolism.  Trace bilateral pleural effusions.  Right lower lobebronchial wall thickening raising the possibility of   bronchitis.  < from: US Duplex Venous Lower Ext Ltd, Right (04.22.19 @ 10:56) >  IMPRESSION:     No evidence of right lower extremity deep venous thrombosis.      < end of copied text >                      BECKY HOLLAND     < end of copied text >      EKG:  Normal Ekg    Telemetry: Sinus    ECHO:  < from: Transthoracic Echocardiogram (04.23.19 @ 09:14) >(my reading)   Summary     Moderate eccentric(posterior)mitral regurgitation is present.   Mild mitral annular calcification is present.   Mild aortic sclerosis is present with normal valvular opening.   Moderate (2+) tricuspid valve regurgitation is present.   Moderate to severe pulmonary hypertension.   Normal appearing pulmonic valve structure and function.   The left atrium is moderately dilated.   The left ventricle is normal in size, wall motion and contractility.   Mild concentric left ventricular hypertrophy is present.   Estimated left ventricular ejection fraction is 60-65 %.   Normal appearing right atrium.   Normal appearing right ventricle structure and function.   No evidence of pericardial effusion.     Signature     ----------------------------------------------------------------   Electronically signed by Donna Arellano MD(Interpreting   physician) on 04/23/2019 05:18 PM    < end of copied text >

## 2019-04-24 NOTE — DISCHARGE NOTE PROVIDER - CARE PROVIDER_API CALL
Latasha Hilton)  Internal Medicine  [address] [place] [address]  Phone: [phone]  Fax: [phone]  Follow Up Time:

## 2019-04-24 NOTE — DISCHARGE NOTE PROVIDER - HOSPITAL COURSE
c/c: sob        HPI: 73 year old M with PMH of CAD s/p stents (PCI stent LAD (2000), ASMI 2000), Paroxysmal Afib, GOLDY on CPAP, presents with complaint of dyspnea on exertion for 2 days. Patient was recently hospitalized at [place] for Septic arthritis of right knee. Patient was admitted for 1 week and discharged with PICC line in right arm and daily cubicin. Patient states he started having dyspnea on exertion yesterday with walking. Denies chest pain, nausea, vomiting, fever, chills, abdominal pain, knee pain, lower extremity edema. Patient states he had normal stress test and echo recently. Patient's cardiologist is Dr. Ross in [place].        Records obtained from Dr. Ross: Echo 2018: LVEF 60-65%, mild LVH, mild aortic root dilatation, normal LV and sy stolic fxn, no Pulm HTN, trace MR, diastolic dysfxn    - Nuclear stress test 2017: Small, mild, fixed anteroapical defect. No definite ischemia.        4/23: sob much improved; not on O2    Hb 10.2; baseline last yr was 14 ( pt on asa 325 mg po bid); hx of colon polyps in the past            PHYSICAL EXAM:        Daily       Daily         ICU Vital Signs Last 24 Hrs    T(C): 36.4 (24 Apr 2019 04:28), Max: 36.9 (23 Apr 2019 16:40)    T(F): 97.5 (24 Apr 2019 04:28), Max: 98.4 (23 Apr 2019 16:40)    HR: 56 (24 Apr 2019 04:28) (56 - 66)    BP: 147/58 (24 Apr 2019 04:28) (142/69 - 170/73)    BP(mean): --    ABP: --    ABP(mean): --    RR: 17 (24 Apr 2019 04:28) (16 - 17)    SpO2: 96% (24 Apr 2019 04:28) (93% - 96%)            Constitutional: Well appearing    HEENT: Atraumatic, GARRET, Normal, No congestion    Respiratory: Breath Sounds normal, no rhonchi/wheeze    Cardiovascular: N S1S2; ALDO present    Gastrointestinal: Abdomen soft, non tender, Bowel Sounds present    Extremities: right leg edema, peripheral pulses present, right knee sutures    Neurological: AAO x 3, no gross focal motor deficits    Skin: Non cellulitic, no rash, ulcers    Lymph Nodes: No lymphadenopathy noted    Back: No CVA tenderness     Musculoskeletal: non tender    Breasts: Deferred    Genitourinary: deferred    Rectal: Deferred            73 year old M with PMH of CAD s/p stents (LAD 2001, 2007 drug eluting stent), GOLDY on CPAP, presents with complaint of dyspnea on exertion for 2 days.        #) Dyspnea on exertion, Elevated Troponin: Mildly elevated troponin now downtrending. No CP, no current dyspnea. Pt recent treatment for Septic arthritis on abx treatment. Kendra score 97. AMY risk score 20 (low). No clinical evidence of HF. - - -PE/DVT ruled out with CTA and doppler.    - Admitted to Telemetry    - Cardiology Consulted:  Continue ASA, Start Plavix; Pt declined Plavix. iv Lasix 40 mg  given on 4/23, f/u, better, so more sob    - Trend Troponin (0.087 -> 0.062)    - Echo noted; mod to severe MR, mod TR, severe pulmonary HTN        #) mod to severe MR, mod TR, severe pulmonary HTN: see you pcp and cardio along with a pulmonologist for further management.    If needed, JOHN could be done to r/o endocarditis if ABX failure suspected.         #) Acute anemia: r/o GI cause    check FOBT with PCP    repeat cbc in 2-3 days with pcp        #) Septic Arthritis: Leukocytosis    - Continue ABX through PICC        #) GOLDY on CPAP    - CPAP qhs        #) HTN    - Continue medications        DISPO: f/u with your PCP Dr. Elissa Hilton in 1-3 days. Check Stool for occult blood. check CBC in 2-3 days. f/u with your cardiologist in 1-3 days for above echo abnormalities. See a pulmonologist for severe pulmonary HTN.  GI work up as out pt. Reduce weight. Right knee suture removal.         d/c home today; pt eager to go home.         poc discussed with pt, team, Dr. Oneal. Called pt's PCP Dr. Elissa Hilton's office in [facility]. She is on vacation. I was told by  that Covering Physician would call me later on.         total time spent 47 min. c/c: sob        HPI: 73 year old M with PMH of CAD s/p stents (PCI stent LAD (2000), ASMI 2000), Paroxysmal Afib, GOLDY on CPAP, presents with complaint of dyspnea on exertion for 2 days. Patient was recently hospitalized at [place] for Septic arthritis of right knee. Patient was admitted for 1 week and discharged with PICC line in right arm and daily cubicin. Patient states he started having dyspnea on exertion yesterday with walking. Denies chest pain, nausea, vomiting, fever, chills, abdominal pain, knee pain, lower extremity edema. Patient states he had normal stress test and echo recently. Patient's cardiologist is Dr. Ross in [place].        Records obtained from Dr. Ross: Echo 2018: LVEF 60-65%, mild LVH, mild aortic root dilatation, normal LV and sy stolic fxn, no Pulm HTN, trace MR, diastolic dysfxn    - Nuclear stress test 2017: Small, mild, fixed anteroapical defect. No definite ischemia.        4/23: sob much improved; not on O2    Hb 10.2; baseline last yr was 14 ( pt on asa 325 mg po bid); hx of colon polyps in the past            PHYSICAL EXAM:        Daily       Daily         ICU Vital Signs Last 24 Hrs    T(C): 36.4 (24 Apr 2019 04:28), Max: 36.9 (23 Apr 2019 16:40)    T(F): 97.5 (24 Apr 2019 04:28), Max: 98.4 (23 Apr 2019 16:40)    HR: 56 (24 Apr 2019 04:28) (56 - 66)    BP: 147/58 (24 Apr 2019 04:28) (142/69 - 170/73)    BP(mean): --    ABP: --    ABP(mean): --    RR: 17 (24 Apr 2019 04:28) (16 - 17)    SpO2: 96% (24 Apr 2019 04:28) (93% - 96%)            Constitutional: Well appearing    HEENT: Atraumatic, GARRET, Normal, No congestion    Respiratory: Breath Sounds normal, no rhonchi/wheeze    Cardiovascular: N S1S2; ALDO present    Gastrointestinal: Abdomen soft, non tender, Bowel Sounds present    Extremities: right leg edema, peripheral pulses present, right knee sutures    Neurological: AAO x 3, no gross focal motor deficits    Skin: Non cellulitic, no rash, ulcers    Lymph Nodes: No lymphadenopathy noted    Back: No CVA tenderness     Musculoskeletal: non tender    Breasts: Deferred    Genitourinary: deferred    Rectal: Deferred            73 year old M with PMH of CAD s/p stents (LAD 2001, 2007 drug eluting stent), GOLDY on CPAP, presents with complaint of dyspnea on exertion for 2 days.        #) Dyspnea on exertion, Elevated Troponin: Mildly elevated troponin now downtrending. No CP, no current dyspnea. Pt recent treatment for Septic arthritis on abx treatment. Kendra score 97. AMY risk score 20 (low). No clinical evidence of HF. - - -PE/DVT ruled out with CTA and doppler.    - Admitted to Telemetry    - Cardiology Consulted:  Continue ASA, Start Plavix; Pt declined Plavix. iv Lasix 40 mg  given on 4/23, f/u, better, so more sob    - Trend Troponin (0.087 -> 0.062)    - Echo noted; mod to severe MR, mod TR, severe pulmonary HTN        #) mod to severe MR, mod TR, severe pulmonary HTN: see you pcp and cardio along with a pulmonologist for further management.    If needed, JOHN could be done to r/o endocarditis if ABX failure suspected.         #) Acute anemia: r/o GI cause    check FOBT with PCP    repeat cbc in 2-3 days with pcp        #) Septic Arthritis: Leukocytosis    - Continue ABX through PICC        #) GOLDY on CPAP    - CPAP qhs        #) HTN    - Continue medications        DISPO: f/u with your PCP Dr. Elissa Hilton in 1-3 days. Check Stool for occult blood. check CBC in 2-3 days. f/u with your cardiologist in 1-3 days for above echo abnormalities. See a pulmonologist for severe pulmonary HTN.  GI work up as out pt. Reduce weight. Right knee suture removal. f/u with your infectious disease doctor for the duration of ABX therapy.         d/c home today; pt eager to go home.         poc discussed with pt, team, Dr. Oneal. Called pt's PCP Dr. Elissa Hilton's office in [facility]. She is on vacation. I was told by  that Covering Physician would call me later on.         total time spent 47 min. c/c: sob        HPI: 73 year old M with PMH of CAD s/p stents (PCI stent LAD (2000), ASMI 2000), Paroxysmal Afib, GOLDY on CPAP, presents with complaint of dyspnea on exertion for 2 days. Patient was recently hospitalized at [place] for Septic arthritis of right knee. Patient was admitted for 1 week and discharged with PICC line in right arm and daily cubicin. Patient states he started having dyspnea on exertion yesterday with walking. Denies chest pain, nausea, vomiting, fever, chills, abdominal pain, knee pain, lower extremity edema. Patient states he had normal stress test and echo recently. Patient's cardiologist is Dr. Ross in [place].        Records obtained from Dr. Ross: Echo 2018: LVEF 60-65%, mild LVH, mild aortic root dilatation, normal LV and sy stolic fxn, no Pulm HTN, trace MR, diastolic dysfxn    - Nuclear stress test 2017: Small, mild, fixed anteroapical defect. No definite ischemia.        4/24: sob much improved; not on O2    Hb 10.5 today stable; baseline last yr was 14 ( pt on asa 325 mg po bid); hx of colon polyps in the past            PHYSICAL EXAM:        Daily       Daily         ICU Vital Signs Last 24 Hrs    T(C): 36.4 (24 Apr 2019 04:28), Max: 36.9 (23 Apr 2019 16:40)    T(F): 97.5 (24 Apr 2019 04:28), Max: 98.4 (23 Apr 2019 16:40)    HR: 56 (24 Apr 2019 04:28) (56 - 66)    BP: 147/58 (24 Apr 2019 04:28) (142/69 - 170/73)    BP(mean): --    ABP: --    ABP(mean): --    RR: 17 (24 Apr 2019 04:28) (16 - 17)    SpO2: 96% (24 Apr 2019 04:28) (93% - 96%)            Constitutional: Well appearing    HEENT: Atraumatic, GARRET, Normal, No congestion    Respiratory: Breath Sounds normal, no rhonchi/wheeze    Cardiovascular: N S1S2; ALDO present    Gastrointestinal: Abdomen soft, non tender, Bowel Sounds present    Extremities: right leg edema, peripheral pulses present, right knee sutures    Neurological: AAO x 3, no gross focal motor deficits    Skin: Non cellulitic, no rash, ulcers    Lymph Nodes: No lymphadenopathy noted    Back: No CVA tenderness     Musculoskeletal: non tender    Breasts: Deferred    Genitourinary: deferred    Rectal: Deferred            73 year old M with PMH of CAD s/p stents (LAD 2001, 2007 drug eluting stent), GOLDY on CPAP, presents with complaint of dyspnea on exertion for 2 days.        #) Dyspnea on exertion, Elevated Troponin: Mildly elevated troponin now downtrending. No CP, no current dyspnea. Pt recent treatment for Septic arthritis on abx treatment. Kendra score 97. AMY risk score 20 (low). No clinical evidence of HF. - - -PE/DVT ruled out with CTA and doppler.    - Admitted to Telemetry    - Cardiology Consulted:  Continue ASA, Start Plavix; Pt declined Plavix. iv Lasix 40 mg  given on 4/23, f/u, better, so more sob    - Trend Troponin (0.087 -> 0.062)    - Echo noted; mod to severe MR, mod TR, severe pulmonary HTN        #) mod to severe MR, mod TR, severe pulmonary HTN: see you pcp and cardio along with a pulmonologist for further management.    If needed, JOHN could be done to r/o endocarditis if ABX failure suspected.         #) Acute anemia: r/o GI cause    check FOBT with PCP    repeat cbc in 2-3 days with pcp        #) Septic Arthritis: Leukocytosis    - Continue ABX through PICC        #) GOLDY on CPAP    - CPAP qhs        #) HTN    - Continue medications        DISPO: f/u with your PCP Dr. Elissa Hilton in 1-3 days. Check Stool for occult blood. check CBC in 2-3 days. f/u with your cardiologist in 1-3 days for above echo abnormalities. See a pulmonologist for severe pulmonary HTN.  GI work up as out pt. Reduce weight. Right knee suture removal. f/u with your infectious disease doctor for the duration of ABX therapy.         d/c home today; pt eager to go home.         poc discussed with pt, team, Dr. Oneal. Called pt's PCP Dr. Elissa Hilton's office in [facility]. She is on vacation. I was told by  that Covering Physician would call me later on.         total time spent 47 min.

## 2019-04-24 NOTE — DISCHARGE NOTE PROVIDER - NSDCCPCAREPLAN_GEN_ALL_CORE_FT
PRINCIPAL DISCHARGE DIAGNOSIS  Diagnosis: Dyspnea  Assessment and Plan of Treatment:       SECONDARY DISCHARGE DIAGNOSES  Diagnosis: Elevated troponin  Assessment and Plan of Treatment:

## 2019-04-24 NOTE — PROGRESS NOTE ADULT - ASSESSMENT
73 year old man with the above history admitted with shortness of breath(over many hours), mildly elevated flat troponins, an elevated BNP and a normal Ekg.  He also has evidence for a possible bronchitis as seen on CT scan.  I do not feel this represents ACS.  It may be related to CHF.  Will give one dose of IV furosemide.  Continue antibiotics.  Will order a TTE.    4/24/19:  Patient appears clinically stable.  Does have the above abnormalities on echocardiography.  He can follow up with his cardiologist at [facility] as an outpatient.

## 2019-04-29 DIAGNOSIS — E66.9 OBESITY, UNSPECIFIED: ICD-10-CM

## 2019-04-29 DIAGNOSIS — I27.20 PULMONARY HYPERTENSION, UNSPECIFIED: ICD-10-CM

## 2019-04-29 DIAGNOSIS — Z87.891 PERSONAL HISTORY OF NICOTINE DEPENDENCE: ICD-10-CM

## 2019-04-29 DIAGNOSIS — N40.0 BENIGN PROSTATIC HYPERPLASIA WITHOUT LOWER URINARY TRACT SYMPTOMS: ICD-10-CM

## 2019-04-29 DIAGNOSIS — F41.9 ANXIETY DISORDER, UNSPECIFIED: ICD-10-CM

## 2019-04-29 DIAGNOSIS — M00.9 PYOGENIC ARTHRITIS, UNSPECIFIED: ICD-10-CM

## 2019-04-29 DIAGNOSIS — Z95.5 PRESENCE OF CORONARY ANGIOPLASTY IMPLANT AND GRAFT: ICD-10-CM

## 2019-04-29 DIAGNOSIS — I25.10 ATHEROSCLEROTIC HEART DISEASE OF NATIVE CORONARY ARTERY WITHOUT ANGINA PECTORIS: ICD-10-CM

## 2019-04-29 DIAGNOSIS — E78.5 HYPERLIPIDEMIA, UNSPECIFIED: ICD-10-CM

## 2019-04-29 DIAGNOSIS — F32.9 MAJOR DEPRESSIVE DISORDER, SINGLE EPISODE, UNSPECIFIED: ICD-10-CM

## 2019-04-29 DIAGNOSIS — Z88.0 ALLERGY STATUS TO PENICILLIN: ICD-10-CM

## 2019-04-29 DIAGNOSIS — Z96.651 PRESENCE OF RIGHT ARTIFICIAL KNEE JOINT: ICD-10-CM

## 2019-04-29 DIAGNOSIS — G47.33 OBSTRUCTIVE SLEEP APNEA (ADULT) (PEDIATRIC): ICD-10-CM

## 2019-04-29 DIAGNOSIS — Z88.3 ALLERGY STATUS TO OTHER ANTI-INFECTIVE AGENTS: ICD-10-CM

## 2019-04-29 DIAGNOSIS — R06.00 DYSPNEA, UNSPECIFIED: ICD-10-CM

## 2019-04-29 DIAGNOSIS — D64.9 ANEMIA, UNSPECIFIED: ICD-10-CM

## 2019-04-29 DIAGNOSIS — Z96.611 PRESENCE OF RIGHT ARTIFICIAL SHOULDER JOINT: ICD-10-CM

## 2019-04-29 DIAGNOSIS — I25.2 OLD MYOCARDIAL INFARCTION: ICD-10-CM

## 2019-04-29 DIAGNOSIS — Z79.82 LONG TERM (CURRENT) USE OF ASPIRIN: ICD-10-CM

## 2019-04-29 DIAGNOSIS — I08.1 RHEUMATIC DISORDERS OF BOTH MITRAL AND TRICUSPID VALVES: ICD-10-CM

## 2020-03-09 NOTE — ED ADULT NURSE NOTE - CAS EDP DISCH TYPE
Home Ftsg Text: We discussed various closure modalities with the patient, including healing by second intention, primary closure, skin graft and various flaps.  The location and configuration of the defect indicated that a Full Thickness Skin Graft would result in the least disturbance of the position and function of the surrounding anatomic structures, and provide the best result.  The technique, its benefits, alternatives and risks were discussed with the patient.  The patient underwent the procedure as follows: The patient was positioned supine on the operating room table.  The area of the defect and the surrounding skin were anesthetized with buffered 1% lidocaine with epinephrine.  The area was washed with chlorhexidine.  Sterile drapes were applied. \\n\\nThe defect edges were debeveled with a #15 scalpel blade.  Using a sterile surgical marker, the primary defect shape was transferred to the donor site. The area thus outlined was incised with a #15 scalpel blade.  The harvested graft was then trimmed of adipose tissue until only dermis and epidermis was left.  The skin margins of the secondary defect were undermined to an appropriate distance in all directions utilizing iris scissors.  The secondary defect was closed with an intermediate layered closure.  The skin graft was then placed and sewn in the primary defect and oriented appropriately with running fast absorbing sutures.

## 2022-05-11 NOTE — ED ADULT NURSE NOTE - NSSUSCREENINGQ1_ED_ALL_ED
Patient's HM shows they are overdue for [facility] and  files searched without success. No results to attach to order nor HM updated. lmom asking pt to call the office if she has had mammogram done elsewhere so her chart can be updated and if not, asked pt to call [phone] to schedule. No

## 2024-10-17 ENCOUNTER — APPOINTMENT (OUTPATIENT)
Dept: OTOLARYNGOLOGY | Facility: CLINIC | Age: 79
End: 2024-10-17
Payer: MEDICARE

## 2024-10-17 ENCOUNTER — NON-APPOINTMENT (OUTPATIENT)
Age: 79
End: 2024-10-17

## 2024-10-17 VITALS — WEIGHT: 230 LBS | HEIGHT: 67 IN | BODY MASS INDEX: 36.1 KG/M2

## 2024-10-17 DIAGNOSIS — R26.89 OTHER ABNORMALITIES OF GAIT AND MOBILITY: ICD-10-CM

## 2024-10-17 DIAGNOSIS — R29.818 OTHER SYMPTOMS AND SIGNS INVOLVING THE NERVOUS SYSTEM: ICD-10-CM

## 2024-10-17 DIAGNOSIS — H81.92 UNSPECIFIED DISORDER OF VESTIBULAR FUNCTION, LEFT EAR: ICD-10-CM

## 2024-10-17 DIAGNOSIS — Z86.79 PERSONAL HISTORY OF OTHER DISEASES OF THE CIRCULATORY SYSTEM: ICD-10-CM

## 2024-10-17 PROCEDURE — 92504 EAR MICROSCOPY EXAMINATION: CPT

## 2024-10-17 PROCEDURE — 99204 OFFICE O/P NEW MOD 45 MIN: CPT

## 2024-10-17 RX ORDER — METHYLPREDNISOLONE 4 MG
125 TABLET, DOSE PACK ORAL
Refills: 0 | Status: ACTIVE | COMMUNITY

## 2024-10-17 RX ORDER — LEVOTHYROXINE SODIUM 0.17 MG/1
TABLET ORAL
Refills: 0 | Status: ACTIVE | COMMUNITY

## 2024-10-17 RX ORDER — HYDROCHLOROTHIAZIDE 12.5 MG/1
TABLET ORAL
Refills: 0 | Status: ACTIVE | COMMUNITY

## 2024-10-17 RX ORDER — ATORVASTATIN CALCIUM 80 MG/1
TABLET, FILM COATED ORAL
Refills: 0 | Status: ACTIVE | COMMUNITY

## 2024-10-23 ENCOUNTER — OFFICE (OUTPATIENT)
Dept: URBAN - METROPOLITAN AREA CLINIC 6 | Facility: CLINIC | Age: 79
Setting detail: OPHTHALMOLOGY
End: 2024-10-23
Payer: MEDICARE

## 2024-10-23 DIAGNOSIS — H40.1131: ICD-10-CM

## 2024-10-23 DIAGNOSIS — H11.153: ICD-10-CM

## 2024-10-23 DIAGNOSIS — H43.393: ICD-10-CM

## 2024-10-23 PROCEDURE — 92133 CPTRZD OPH DX IMG PST SGM ON: CPT

## 2024-10-23 PROCEDURE — 92083 EXTENDED VISUAL FIELD XM: CPT

## 2024-10-23 PROCEDURE — 92014 COMPRE OPH EXAM EST PT 1/>: CPT

## 2024-10-23 ASSESSMENT — CONFRONTATIONAL VISUAL FIELD TEST (CVF)
OS_FINDINGS: FULL
OD_FINDINGS: FULL

## 2024-10-23 ASSESSMENT — PACHYMETRY
OD_CT_CORRECTION: 1
OS_CT_CORRECTION: 1
OD_CT_UM: 536
OS_CT_UM: 521

## 2024-10-23 ASSESSMENT — REFRACTION_AUTOREFRACTION
OD_CYLINDER: -1.00
OS_CYLINDER: -1.50
OS_AXIS: 096
OD_SPHERE: -0.25
OD_AXIS: 064
OS_SPHERE: +0.25

## 2024-10-23 ASSESSMENT — VISUAL ACUITY
OD_BCVA: 20/20-3
OS_BCVA: 20/25

## 2024-10-23 ASSESSMENT — KERATOMETRY
OS_K1POWER_DIOPTERS: 41.75
OD_AXISANGLE_DEGREES: 140
OD_K1POWER_DIOPTERS: 41.25
METHOD_AUTO_MANUAL: AUTO
OS_K2POWER_DIOPTERS: 42.00
OD_K2POWER_DIOPTERS: 41.75
OS_AXISANGLE_DEGREES: 176

## 2024-10-23 ASSESSMENT — TONOMETRY
OD_IOP_MMHG: 17
OS_IOP_MMHG: 15

## 2024-12-17 ENCOUNTER — APPOINTMENT (OUTPATIENT)
Dept: OTOLARYNGOLOGY | Facility: CLINIC | Age: 79
End: 2024-12-17

## 2025-01-13 NOTE — ED PROVIDER NOTE - CPE EDP GU CVA TENDER
no tenderness 1 Principal Discharge DX:	Fall from standing, initial encounter  Secondary Diagnosis:	Minor head injury